# Patient Record
Sex: MALE | Race: OTHER | NOT HISPANIC OR LATINO | Employment: OTHER | URBAN - METROPOLITAN AREA
[De-identification: names, ages, dates, MRNs, and addresses within clinical notes are randomized per-mention and may not be internally consistent; named-entity substitution may affect disease eponyms.]

---

## 2017-01-31 ENCOUNTER — OFFICE VISIT (OUTPATIENT)
Dept: CARDIOLOGY | Facility: MEDICAL CENTER | Age: 62
End: 2017-01-31
Payer: COMMERCIAL

## 2017-01-31 VITALS
HEART RATE: 70 BPM | OXYGEN SATURATION: 94 % | WEIGHT: 248 LBS | SYSTOLIC BLOOD PRESSURE: 122 MMHG | DIASTOLIC BLOOD PRESSURE: 72 MMHG | BODY MASS INDEX: 32.87 KG/M2 | HEIGHT: 73 IN

## 2017-01-31 DIAGNOSIS — I15.9 SECONDARY HYPERTENSION: ICD-10-CM

## 2017-01-31 DIAGNOSIS — I48.91 ATRIAL FIBRILLATION, UNSPECIFIED TYPE (HCC): ICD-10-CM

## 2017-01-31 LAB — EKG IMPRESSION: NORMAL

## 2017-01-31 PROCEDURE — 93000 ELECTROCARDIOGRAM COMPLETE: CPT | Performed by: NURSE PRACTITIONER

## 2017-01-31 PROCEDURE — 99214 OFFICE O/P EST MOD 30 MIN: CPT | Performed by: NURSE PRACTITIONER

## 2017-01-31 RX ORDER — TESTOSTERONE 16.2 MG/G
GEL TRANSDERMAL
Refills: 0 | Status: ON HOLD | COMMUNITY
Start: 2016-12-06 | End: 2023-09-29

## 2017-01-31 RX ORDER — EFINACONAZOLE 100 MG/ML
SOLUTION TOPICAL
Refills: 12 | COMMUNITY
Start: 2016-11-14 | End: 2021-09-28

## 2017-01-31 NOTE — MR AVS SNAPSHOT
"        Myke Bustos   2017 3:20 PM   Office Visit   MRN: [id number]    Department:  [facility]   Dept Phone:  [phone]    Description:  Male : 1955   Provider:  AALIYAH Larkin           Reason for Visit     Follow-Up           Allergies as of 2017     No Known Allergies      You were diagnosed with     Atrial fibrillation, unspecified type (CMS-HCC)   [4219523]       Secondary hypertension   [8723840]         Vital Signs     Blood Pressure Pulse Height Weight Body Mass Index Oxygen Saturation    122/72 mmHg 70 1.854 m (6' 1\") 112.492 kg (248 lb) 32.73 kg/m2 94%    Smoking Status                   Never Smoker            Basic Information     Date Of Birth Sex Race Ethnicity Preferred Language    1955 Male White Non- English      Your appointments     Mar 16, 2017  2:30 PM   FOLLOW UP with Dalton Darden M.D.   [facility] (--)    [address], [facility] [address]  [facility] [id number] [phone]              Problem List              ICD-10-CM Priority Class Noted - Resolved    Atrial fibrillation (CMS-HCC) I48.91 Medium  2011 - Present    Long term current use of anticoagulant therapy Z79.01 High  2011 - Present    Pure hypercholesterolemia E78.00   2011 - Present    Essential hypertension, benign I10 High  2011 - Present    Hypothyroidism E03.9 Low  2011 - Present    Obesity E66.9 Low  2011 - Present    Sleep apnea (Chronic) G47.30   Unknown - Present    PAF (paroxysmal atrial fibrillation) (HCC) I48.0 Low  2011 - Present    Other general symptoms (Chronic) R68.89   Unknown - Present    Hypertension I10 Low  2013 - Present    HLD (hyperlipidemia) E78.5 Low  2016 - Present    High risk medication use Z79.899   2016 - Present    Chronic anticoagulation Z79.01   2016 - Present      Health Maintenance        Date Due Completion Dates    IMM DTaP/Tdap/Td Vaccine (1 " - Tdap) 4/15/1976 ---    COLONOSCOPY 4/15/2007 ---            Results       Current Immunizations     Influenza Vaccine Quad Inj (Pf) 12/17/2016  3:42 PM      Below and/or attached are the medications your provider expects you to take. Review all of your home medications and newly ordered medications with your provider and/or pharmacist. Follow medication instructions as directed by your provider and/or pharmacist. Please keep your medication list with you and share with your provider. Update the information when medications are discontinued, doses are changed, or new medications (including over-the-counter products) are added; and carry medication information at all times in the event of emergency situations     Allergies:  No Known Allergies          Medications  Valid as of: January 31, 2017 -  4:09 PM    Generic Name Brand Name Tablet Size Instructions for use    Digoxin (Tab) LANOXIN 250 MCG Take 1 Tab by mouth every day.        Efinaconazole (Solution) JUBLIA 10 % DARWIN ON THE SKIN D UTD        Levothyroxine Sodium (Tab) SYNTHROID 150 MCG Take 150 mcg by mouth Every morning on an empty stomach.        Simvastatin (Tab) ZOCOR 10 MG Take 10 mg by mouth every evening.        Sotalol HCl (Tab) BETAPACE 120 MG Take 1 Tab by mouth 2 Times a Day.        Testosterone (Gel) ANDROGEL PUMP 20.25 MG/ACT (1.62%) DARWIN 2 PUMPS TO EACH SHOULDER AND UPPER ARM QAM        Valsartan (Tab) DIOVAN 160 MG Take 160 mg by mouth 2 times a day.        .                 Medicines prescribed today were sent to:     [facility] [address]    [address] [facility] [address]nd [address] [facility] [address]    Phone: [phone] Fax: [phone]    Open 24 Hours?: No      Medication refill instructions:       If your prescription bottle indicates you have medication refills left, it is not necessary to call your provider’s office. Please contact your pharmacy and they will refill your medication.    If your  prescription bottle indicates you do not have any refills left, you may request refills at any time through one of the following ways: The online Duokan.com system (except Urgent Care), by calling your provider’s office, or by asking your pharmacy to contact your provider’s office with a refill request. Medication refills are processed only during regular business hours and may not be available until the next business day. Your provider may request additional information or to have a follow-up visit with you prior to refilling your medication.   *Please Note: Medication refills are assigned a new Rx number when refilled electronically. Your pharmacy may indicate that no refills were authorized even though a new prescription for the same medication is available at the pharmacy. Please request the medicine by name with the pharmacy before contacting your provider for a refill.           [email or web address] Access Code: [email or web address]  Expires: 2/10/2017  3:27 PM    Duokan.com  A secure, online tool to manage your health information     [employer]’s Duokan.com® is a secure, online tool that connects you to your personalized health information from the privacy of your home -- day or night - making it very easy for you to manage your healthcare. Once the activation process is completed, you can even access your medical information using the Duokan.com christopher, which is available for free in the Apple Christopher store or Google Play store.     Duokan.com provides the following levels of access (as shown below):   My Chart Features   [facility] Doctor [facility]  Specialists [facility]  Urgent  Care Non-[facility]  Primary Care  Doctor   Email your healthcare team securely and privately 24/7 X X X    Manage appointments: schedule your next appointment; view details of past/upcoming appointments X      Request prescription refills. X      View recent personal medical records, including lab and immunizations X X X X   View health record, including health  history, allergies, medications X X X X   Read reports about your outpatient visits, procedures, consult and ER notes X X X X   See your discharge summary, which is a recap of your hospital and/or ER visit that includes your diagnosis, lab results, and care plan. X X       How to register for UpCloo:  1. Go to  [email or web address].  2. Click on the Sign Up Now box, which takes you to the New Member Sign Up page. You will need to provide the following information:  a. Enter your UpCloo Access Code exactly as it appears at the top of this page. (You will not need to use this code after you’ve completed the sign-up process. If you do not sign up before the expiration date, you must request a new code.)   b. Enter your date of birth.   c. Enter your home email address.   d. Click Submit, and follow the next screen’s instructions.  3. Create a UpCloo ID. This will be your UpCloo login ID and cannot be changed, so think of one that is secure and easy to remember.  4. Create a Teralynkt password. You can change your password at any time.  5. Enter your Password Reset Question and Answer. This can be used at a later time if you forget your password.   6. Enter your e-mail address. This allows you to receive e-mail notifications when new information is available in UpCloo.  7. Click Sign Up. You can now view your health information.    For assistance activating your UpCloo account, call [phone]

## 2017-01-31 NOTE — Clinical Note
[facility]   [address] [place] [address] [address]  [place] [address]  Phone: [phone]  Fax: [phone]              Myke Bustos  1955    Encounter Date: 1/31/2017    AALIYAH Larkin          PROGRESS NOTE:  No notes on file      BLADIMIR Coon  [address]  [address]  [facility] [address]  VIA Mail

## 2017-02-01 ASSESSMENT — ENCOUNTER SYMPTOMS
ORTHOPNEA: 0
DIARRHEA: 0
HEARTBURN: 0
WEIGHT LOSS: 0
FOCAL WEAKNESS: 0
CONSTIPATION: 0
SORE THROAT: 0
VOMITING: 0
SPEECH CHANGE: 0
CHILLS: 0
WHEEZING: 0
LOSS OF CONSCIOUSNESS: 0
BLOOD IN STOOL: 0
ABDOMINAL PAIN: 0
SPUTUM PRODUCTION: 0
SHORTNESS OF BREATH: 0
DOUBLE VISION: 0
PND: 0
BLURRED VISION: 0
SENSORY CHANGE: 0
MUSCULOSKELETAL NEGATIVE: 1
NAUSEA: 0
COUGH: 0
PALPITATIONS: 0
TINGLING: 0
HEMOPTYSIS: 0
HEADACHES: 0
DIZZINESS: 0
FEVER: 0
TREMORS: 0

## 2017-02-01 NOTE — PROGRESS NOTES
Subjective:   Myke Bustos is a 61 y.o. male who presents today for follow up Afib and anticoagulation.    He was admitted to [facility] for N/DV/D, abd pain Dec 2016.  Had a syncopal episode with vomiting felt to be vasovagal in nature.  During admission his Sotalol was held secondary to PO intolerance and he eventually developed Afib with RVR.  He was started on Cardizem for rate control and eventually transitioned back to Sotalol.  He was cardioverted back into NSR.    Today in follow up he states that he has continued to do well.  He states that he has not had any recurrence of Afib. No syncope or pre syncope.  No dizziness, chest pain, or dyspnea.    He has been taking his medication without missed doses.    He would like to consider transitioning back to ASA instead of Xarelto.        Past Medical History   Diagnosis Date   • Atrial fibrillation (CMS-HCC) 12/29/2011   • Long term (current) use of anticoagulants 12/29/2011   • Pure hypercholesterolemia 12/29/2011   • Essential hypertension, benign 12/29/2011   • Unspecified hypothyroidism 12/29/2011   • Obesity 12/29/2011   • Sleep apnea 12/29/2011   • Other general symptoms(780.99)      Past Surgical History   Procedure Laterality Date   • Appendectomy       1994     Family History   Problem Relation Age of Onset   • Heart Disease Mother      ACUTE MI IN HER 60s.   • Heart Disease Father      ACUTE MI IN HIS 50s.     History   Smoking status   • Never Smoker    Smokeless tobacco   • Never Used     No Known Allergies  Outpatient Encounter Prescriptions as of 1/31/2017   Medication Sig Dispense Refill   • ANDROGEL PUMP 20.25 MG/ACT (1.62%) Gel DARWIN 2 PUMPS TO EACH SHOULDER AND UPPER ARM QAM  0   • JUBLIA 10 % Solution DARWIN ON THE SKIN D UTD  12   • sotalol (BETAPACE) 120 MG tablet Take 1 Tab by mouth 2 Times a Day. 60 Tab 3   • levothyroxine (SYNTHROID) 150 MCG Tab Take 150 mcg by mouth Every morning on an empty stomach.     • digoxin (LANOXIN) 250 MCG TABS Take  "1 Tab by mouth every day. 90 Tab 0   • valsartan (DIOVAN) 160 MG TABS Take 160 mg by mouth 2 times a day.     • simvastatin (ZOCOR) 10 MG TABS Take 10 mg by mouth every evening.       No facility-administered encounter medications on file as of 1/31/2017.     Review of Systems   Constitutional: Negative for fever, chills, weight loss and malaise/fatigue.   HENT: Negative for congestion, nosebleeds, sore throat and tinnitus.    Eyes: Negative for blurred vision and double vision.   Respiratory: Negative for cough, hemoptysis, sputum production, shortness of breath and wheezing.    Cardiovascular: Negative for chest pain, palpitations, orthopnea, leg swelling and PND.   Gastrointestinal: Negative for heartburn, nausea, vomiting, abdominal pain, diarrhea, constipation, blood in stool and melena.   Genitourinary: Negative.    Musculoskeletal: Negative.    Skin: Negative for rash.   Neurological: Negative for dizziness, tingling, tremors, sensory change, speech change, focal weakness, loss of consciousness and headaches.        Objective:   /72 mmHg  Pulse 70  Ht 1.854 m (6' 1\")  Wt 112.492 kg (248 lb)  BMI 32.73 kg/m2  SpO2 94%    Physical Exam   Constitutional: He is oriented to person, place, and time. He appears well-developed and well-nourished.   HENT:   Head: Normocephalic and atraumatic.   Eyes: Conjunctivae are normal.   Neck: Normal range of motion. Neck supple. No JVD present.   Cardiovascular: Normal rate, regular rhythm, normal heart sounds and intact distal pulses.  Exam reveals no gallop and no friction rub.    No murmur heard.  Pulmonary/Chest: Effort normal and breath sounds normal. No respiratory distress. He has no wheezes. He has no rales. He exhibits no tenderness.   Abdominal: Soft. Bowel sounds are normal. There is no tenderness.   Musculoskeletal: He exhibits no edema.   Neurological: He is alert and oriented to person, place, and time.   Skin: Skin is warm and dry. No erythema. "   Psychiatric: He has a normal mood and affect. His behavior is normal.       Assessment:     1. Atrial fibrillation, unspecified type (CMS-HCC)  EKG   2. Secondary hypertension  EKG       Medical Decision Making:  Today's Assessment / Status / Plan:   1. Afib:  -12 lead EKG today maintaining NSR.  Per patient report he has not had any recurrence of arrhythmia.   - Continue Sotalol 120 mg BID.  Continue Xarelto.  In one month if no Afib can consider transition back to ASA.    2. High Risk medication (Sotalol):  - QTc today stable at 405 msec .     3. HTN:   - BP well controlled today.  Continue low sodium diet, discussed lifestyle modifications.    4. Anticoagulation:  - Denies s/s of bleeding.   - The patients [id number] score is 1 (HTN).  He was previously on ASA before cardioversion.  He has been on Xarelto for >1 month post cardioversion.  He understands his risk and would like to transition back to ASA.  He will finish Xarelto bottle and then start ASA 81 mg PO daily.       RTC in March as scheduled with Dr. Darden, sooner if needed.  Collaborating MD/ADD: Xi.

## 2017-02-02 ENCOUNTER — TELEPHONE (OUTPATIENT)
Dept: CARDIOLOGY | Facility: MEDICAL CENTER | Age: 62
End: 2017-02-02

## 2017-02-03 NOTE — TELEPHONE ENCOUNTER
----- Message from AALIYAH Larkin sent at 2/2/2017  4:34 PM PST -----  Recent labs- BMP WNL.  TSH was normal too.

## 2017-02-10 ENCOUNTER — TELEPHONE (OUTPATIENT)
Dept: CARDIOLOGY | Facility: MEDICAL CENTER | Age: 62
End: 2017-02-10

## 2017-02-10 NOTE — TELEPHONE ENCOUNTER
"----- Message from Dafne Littlejohn sent at 2/10/2017  9:54 AM PST -----  Regarding: lightheaded & sob last nite  Contact: [phone]  SAIDA/luci    Pt calling to report last nite episode of lightheadedness & shortness of breath after a very long day at work.  Today, pt feels he is \"lacking a little enthusiasm, kind of dragging\".   Please call Myke to discuss, [phone].  "

## 2017-02-10 NOTE — TELEPHONE ENCOUNTER
AALIYAH Larkin R.N.        Caller: Unspecified (Today, 2:43 PM)                     Yes, 120 has been working well for him.  We don't have any evidence for dose increase at this time.  Dr. Alford actually prefers the 120 mg dose for Afib control.  It is possible that dehydration and over doing can cause similar symptoms.  I am encouraged that his rate was regular.      Notified patient.

## 2017-02-10 NOTE — TELEPHONE ENCOUNTER
Pt overworked yesterday, felt lightheaded when he got home.  Rehydrated and felt better except mild SOB which has improved.  /80 HR 74.  Regular rhythm.  SOB has improved today, still feels tired.    Advised to keep hydrated, take it easy. If SOB increases, go to ER .   He is still on xarelto at this time.    ORTIZ Moses: pt said he was previously on 160mg sotalol BID and was concerned he is only taking 120mg BID now.  I reassured him that the lower dose seems to be working well, and less is better if effective.    Anything else I should discuss with pt?

## 2017-03-14 ENCOUNTER — TELEPHONE (OUTPATIENT)
Dept: CARDIOLOGY | Facility: MEDICAL CENTER | Age: 62
End: 2017-03-14

## 2017-03-14 NOTE — TELEPHONE ENCOUNTER
----- Message from Phyllis Copeland sent at 3/14/2017 11:35 AM PDT -----  Regarding: Patient calling for lab results  SAIDA/Lisa    Patient is calling to get his lab results and can be reached at [phone].

## 2017-03-16 ENCOUNTER — OFFICE VISIT (OUTPATIENT)
Dept: CARDIOLOGY | Facility: MEDICAL CENTER | Age: 62
End: 2017-03-16
Payer: COMMERCIAL

## 2017-03-16 VITALS
OXYGEN SATURATION: 91 % | HEART RATE: 77 BPM | HEIGHT: 73 IN | BODY MASS INDEX: 34.33 KG/M2 | DIASTOLIC BLOOD PRESSURE: 82 MMHG | WEIGHT: 259 LBS | SYSTOLIC BLOOD PRESSURE: 128 MMHG

## 2017-03-16 DIAGNOSIS — Z79.899 LONG TERM CURRENT USE OF ANTIARRHYTHMIC MEDICAL THERAPY: ICD-10-CM

## 2017-03-16 DIAGNOSIS — I48.0 PAF (PAROXYSMAL ATRIAL FIBRILLATION) (HCC): ICD-10-CM

## 2017-03-16 DIAGNOSIS — E78.5 HYPERLIPIDEMIA, UNSPECIFIED HYPERLIPIDEMIA TYPE: ICD-10-CM

## 2017-03-16 DIAGNOSIS — I10 ESSENTIAL HYPERTENSION: ICD-10-CM

## 2017-03-16 PROCEDURE — 99214 OFFICE O/P EST MOD 30 MIN: CPT | Performed by: INTERNAL MEDICINE

## 2017-03-16 ASSESSMENT — ENCOUNTER SYMPTOMS
MYALGIAS: 0
ORTHOPNEA: 0
DIZZINESS: 0
SHORTNESS OF BREATH: 0
LOSS OF CONSCIOUSNESS: 0
PND: 0

## 2017-03-16 NOTE — Clinical Note
[facility]   [address]  [place] [address]  Phone: [phone]  Fax: [phone]              Myke Bustos  1955    Encounter Date: 3/16/2017    Dalton Darden M.D.          PROGRESS NOTE:  Subjective:   Myke Bustos is a 57 y.o. male who presents today for follow-up evaluation of PAF, antiarrhythmic therapy, hypertension and hyperlipidemia.     Last seen 8/25/2014.    No cardiac symptoms.  Just returned from a trip from [facility].    Between 12/13/2016-12/17/2016 hospitalized that [facility]  Severe gastroenteritis with nausea and vomiting.  Recurrent atrial fibrillation.  Required electrical cardioversion 12/16/2016.    Past Medical History   Diagnosis Date   • Atrial fibrillation (CMS-HCC) 12/29/2011   • Long term (current) use of anticoagulants 12/29/2011   • Pure hypercholesterolemia 12/29/2011   • Essential hypertension, benign 12/29/2011   • Unspecified hypothyroidism 12/29/2011   • Obesity 12/29/2011   • Sleep apnea 12/29/2011   • Other general symptoms(780.99)      Past Surgical History   Procedure Laterality Date   • Appendectomy       1994     Family History   Problem Relation Age of Onset   • Heart Disease Mother      ACUTE MI IN HER 60s.   • Heart Disease Father      ACUTE MI IN HIS 50s.     History   Smoking status   • Never Smoker    Smokeless tobacco   • Never Used     No Known Allergies  Outpatient Encounter Prescriptions as of 3/16/2017   Medication Sig Dispense Refill   • aspirin EC (ECOTRIN) 81 MG Tablet Delayed Response Take 81 mg by mouth every day.     • ANDROGEL PUMP 20.25 MG/ACT (1.62%) Gel DARWIN 2 PUMPS TO EACH SHOULDER AND UPPER ARM QAM  0   • JUBLIA 10 % Solution DARWIN ON THE SKIN D UTD  12   • sotalol (BETAPACE) 120 MG tablet Take 1 Tab by mouth 2 Times a Day. 60 Tab 3   • levothyroxine (SYNTHROID) 150 MCG Tab Take 150 mcg by mouth Every morning on an empty stomach.     • digoxin (LANOXIN) 250 MCG  "TABS Take 1 Tab by mouth every day. 90 Tab 0   • valsartan (DIOVAN) 160 MG TABS Take 160 mg by mouth 2 times a day.     • simvastatin (ZOCOR) 10 MG TABS Take 10 mg by mouth every evening.       No facility-administered encounter medications on file as of 3/16/2017.     Review of Systems   Respiratory: Negative for shortness of breath.    Cardiovascular: Negative for orthopnea, leg swelling and PND.   Musculoskeletal: Negative for myalgias.   Neurological: Negative for dizziness and loss of consciousness.        Objective:   /82 mmHg  Pulse 77  Ht 1.854 m (6' 0.99\")  Wt 117.482 kg (259 lb)  BMI 34.18 kg/m2  SpO2 91%    Physical Exam   Constitutional: He is oriented to person, place, and time. He appears well-nourished. No distress.   HENT:   Head: Normocephalic.   Eyes: EOM are normal.   Neck: No JVD present. Carotid bruit is not present.   Normal jugular venous pressure.   Cardiovascular: Normal rate, regular rhythm, S1 normal, S2 normal and normal heart sounds.  Exam reveals no gallop and no friction rub.    No murmur heard.  Pulses:       Carotid pulses are 2+ on the right side, and 2+ on the left side.       Radial pulses are 2+ on the right side, and 2+ on the left side.   Pulmonary/Chest: Effort normal and breath sounds normal. He has no wheezes. He has no rhonchi. He has no rales.   Abdominal: Soft. He exhibits no distension. There is no tenderness. There is no guarding.       Musculoskeletal: He exhibits no edema.   Neurological: He is alert and oriented to person, place, and time.   Skin: Skin is warm and dry.   Psychiatric: He has a normal mood and affect. His behavior is normal.       Assessment:     1. PAF (paroxysmal atrial fibrillation) (HCC)     2. Long term current use of antiarrhythmic medical therapy     3. Essential hypertension     4. Hyperlipidemia, unspecified hyperlipidemia type       12/28/2012 Lab: Cholesterol 175. Triglycerides 102. HDL 61. . Digoxin level 0.7.  07/14/2014 " Lab: Cholesterol 136. Triglycerides 64. HDL 49. LDA 74. TSH 5.75.    Medical Decision Making:  Today's Assessment / Status / Plan:     PAF. Maintaining NSR. Continue current therapy.    Electrical cardioversion 12/16/2016.    Antiarrhythmic therapy with sotalol.    Hypertension. Controlled. On valsartan.     HLD. On simvastatin.    Continue current therapy.  Followup one year        BRYAN CoonN.  [address]  [address]  [facility] [address]  VIA Mail

## 2017-03-16 NOTE — PROGRESS NOTES
Subjective:   Myke Bustos is a 57 y.o. male who presents today for follow-up evaluation of PAF, antiarrhythmic therapy, hypertension and hyperlipidemia.     Last seen 8/25/2014.    No cardiac symptoms.  Just returned from a trip from [facility].    Between 12/13/2016-12/17/2016 hospitalized that [facility].  Severe gastroenteritis with nausea and vomiting.  Recurrent atrial fibrillation.  Required electrical cardioversion 12/16/2016.    Past Medical History   Diagnosis Date   • Atrial fibrillation (CMS-HCC) 12/29/2011   • Long term (current) use of anticoagulants 12/29/2011   • Pure hypercholesterolemia 12/29/2011   • Essential hypertension, benign 12/29/2011   • Unspecified hypothyroidism 12/29/2011   • Obesity 12/29/2011   • Sleep apnea 12/29/2011   • Other general symptoms(780.99)      Past Surgical History   Procedure Laterality Date   • Appendectomy       1994     Family History   Problem Relation Age of Onset   • Heart Disease Mother      ACUTE MI IN HER 60s.   • Heart Disease Father      ACUTE MI IN HIS 50s.     History   Smoking status   • Never Smoker    Smokeless tobacco   • Never Used     No Known Allergies  Outpatient Encounter Prescriptions as of 3/16/2017   Medication Sig Dispense Refill   • aspirin EC (ECOTRIN) 81 MG Tablet Delayed Response Take 81 mg by mouth every day.     • ANDROGEL PUMP 20.25 MG/ACT (1.62%) Gel DARWIN 2 PUMPS TO EACH SHOULDER AND UPPER ARM QAM  0   • JUBLIA 10 % Solution DARWIN ON THE SKIN D UTD  12   • sotalol (BETAPACE) 120 MG tablet Take 1 Tab by mouth 2 Times a Day. 60 Tab 3   • levothyroxine (SYNTHROID) 150 MCG Tab Take 150 mcg by mouth Every morning on an empty stomach.     • digoxin (LANOXIN) 250 MCG TABS Take 1 Tab by mouth every day. 90 Tab 0   • valsartan (DIOVAN) 160 MG TABS Take 160 mg by mouth 2 times a day.     • simvastatin (ZOCOR) 10 MG TABS Take 10 mg by mouth every evening.       No facility-administered encounter medications on file as of 3/16/2017.  "    Review of Systems   Respiratory: Negative for shortness of breath.    Cardiovascular: Negative for orthopnea, leg swelling and PND.   Musculoskeletal: Negative for myalgias.   Neurological: Negative for dizziness and loss of consciousness.        Objective:   /82 mmHg  Pulse 77  Ht 1.854 m (6' 0.99\")  Wt 117.482 kg (259 lb)  BMI 34.18 kg/m2  SpO2 91%    Physical Exam   Constitutional: He is oriented to person, place, and time. He appears well-nourished. No distress.   HENT:   Head: Normocephalic.   Eyes: EOM are normal.   Neck: No JVD present. Carotid bruit is not present.   Normal jugular venous pressure.   Cardiovascular: Normal rate, regular rhythm, S1 normal, S2 normal and normal heart sounds.  Exam reveals no gallop and no friction rub.    No murmur heard.  Pulses:       Carotid pulses are 2+ on the right side, and 2+ on the left side.       Radial pulses are 2+ on the right side, and 2+ on the left side.   Pulmonary/Chest: Effort normal and breath sounds normal. He has no wheezes. He has no rhonchi. He has no rales.   Abdominal: Soft. He exhibits no distension. There is no tenderness. There is no guarding.       Musculoskeletal: He exhibits no edema.   Neurological: He is alert and oriented to person, place, and time.   Skin: Skin is warm and dry.   Psychiatric: He has a normal mood and affect. His behavior is normal.       Assessment:     1. PAF (paroxysmal atrial fibrillation) (HCC)     2. Long term current use of antiarrhythmic medical therapy     3. Essential hypertension     4. Hyperlipidemia, unspecified hyperlipidemia type       12/28/2012 Lab: Cholesterol 175. Triglycerides 102. HDL 61. . Digoxin level 0.7.  07/14/2014 Lab: Cholesterol 136. Triglycerides 64. HDL 49. LDA 74. TSH 5.75.    Medical Decision Making:  Today's Assessment / Status / Plan:     PAF. Maintaining NSR. Continue current therapy.    Electrical cardioversion 12/16/2016.    Antiarrhythmic therapy with " sotalol.    Hypertension. Controlled. On valsartan.     HLD. On simvastatin.    Continue current therapy.  Followup one year

## 2017-03-16 NOTE — MR AVS SNAPSHOT
"        Myke Bustos   3/16/2017 2:15 PM   Office Visit   MRN: [id number]    Department:  [facility]   Dept Phone:  [phone]    Description:  Male : 1955   Provider:  Dalton Draden M.D.           Reason for Visit     Follow-Up           Allergies as of 3/16/2017     No Known Allergies      You were diagnosed with     PAF (paroxysmal atrial fibrillation) (CMS-HCC)   [495513]       Long term current use of antiarrhythmic medical therapy   [1686897]       Essential hypertension   [0346305]       Hyperlipidemia, unspecified hyperlipidemia type   [6545677]         Vital Signs     Blood Pressure Pulse Height Weight Body Mass Index Oxygen Saturation    128/82 mmHg 77 1.854 m (6' 0.99\") 117.482 kg (259 lb) 34.18 kg/m2 91%    Smoking Status                   Never Smoker            Basic Information     Date Of Birth Sex Race Ethnicity Preferred Language    1955 Male White Non- English      Problem List              ICD-10-CM Priority Class Noted - Resolved    Pure hypercholesterolemia E78.00   2011 - Present    Essential hypertension, benign I10 High  2011 - Present    Hypothyroidism E03.9 Low  2011 - Present    Obesity E66.9 Low  2011 - Present    Sleep apnea (Chronic) G47.30   Unknown - Present    PAF (paroxysmal atrial fibrillation) (HCC) I48.0 Low  2011 - Present    Other general symptoms (Chronic) R68.89   Unknown - Present    HLD (hyperlipidemia) E78.5 Low  2016 - Present    High risk medication use Z79.899   2016 - Present    Long term current use of antiarrhythmic medical therapy Z79.899   3/16/2017 - Present      Health Maintenance        Date Due Completion Dates    IMM DTaP/Tdap/Td Vaccine (1 - Tdap) 4/15/1976 ---    COLONOSCOPY 4/15/2007 ---            Current Immunizations     Influenza Vaccine Quad Inj (Pf) 2016  3:42 PM      Below and/or attached are the medications your provider expects you to take. Review all of your " home medications and newly ordered medications with your provider and/or pharmacist. Follow medication instructions as directed by your provider and/or pharmacist. Please keep your medication list with you and share with your provider. Update the information when medications are discontinued, doses are changed, or new medications (including over-the-counter products) are added; and carry medication information at all times in the event of emergency situations     Allergies:  No Known Allergies          Medications  Valid as of: March 16, 2017 -  2:46 PM    Generic Name Brand Name Tablet Size Instructions for use    Aspirin (Tablet Delayed Response) ECOTRIN 81 MG Take 81 mg by mouth every day.        Digoxin (Tab) LANOXIN 250 MCG Take 1 Tab by mouth every day.        Efinaconazole (Solution) JUBLIA 10 % DARWIN ON THE SKIN D UTD        Levothyroxine Sodium (Tab) SYNTHROID 150 MCG Take 150 mcg by mouth Every morning on an empty stomach.        Simvastatin (Tab) ZOCOR 10 MG Take 10 mg by mouth every evening.        Sotalol HCl (Tab) BETAPACE 120 MG Take 1 Tab by mouth 2 Times a Day.        Testosterone (Gel) ANDROGEL PUMP 20.25 MG/ACT (1.62%) DARWIN 2 PUMPS TO EACH SHOULDER AND UPPER ARM QAM        Valsartan (Tab) DIOVAN 160 MG Take 160 mg by mouth 2 times a day.        .                 Medicines prescribed today were sent to:     [facility] HOME DELIVERY [facility] [address]    [address] [place] [id number]    Phone: [phone] Fax: [phone]    Open 24 Hours?: No      Medication refill instructions:       If your prescription bottle indicates you have medication refills left, it is not necessary to call your provider’s office. Please contact your pharmacy and they will refill your medication.    If your prescription bottle indicates you do not have any refills left, you may request refills at any time through one of the following ways: The online BoostSuite system (except Urgent  Care), by calling your provider’s office, or by asking your pharmacy to contact your provider’s office with a refill request. Medication refills are processed only during regular business hours and may not be available until the next business day. Your provider may request additional information or to have a follow-up visit with you prior to refilling your medication.   *Please Note: Medication refills are assigned a new Rx number when refilled electronically. Your pharmacy may indicate that no refills were authorized even though a new prescription for the same medication is available at the pharmacy. Please request the medicine by name with the pharmacy before contacting your provider for a refill.           [email or web address] Access Code: [email or web address]  Expires: 4/9/2017  2:36 PM    UPSIDO.com  A secure, online tool to manage your health information     [employer]’s UPSIDO.com® is a secure, online tool that connects you to your personalized health information from the privacy of your home -- day or night - making it very easy for you to manage your healthcare. Once the activation process is completed, you can even access your medical information using the UPSIDO.com christopher, which is available for free in the Apple Christopher store or Google Play store.     UPSIDO.com provides the following levels of access (as shown below):   My Chart Features   [facility] Doctor [facility]  Urgent  Care Non-[facility]  Primary Care  Doctor   Email your healthcare team securely and privately 24/7 X X X    Manage appointments: schedule your next appointment; view details of past/upcoming appointments X      Request prescription refills. X      View recent personal medical records, including lab and immunizations X X X X   View health record, including health history, allergies, medications X X X X   Read reports about your outpatient visits, procedures, consult and ER notes X X X X   See your discharge summary, which is a recap of your  hospital and/or ER visit that includes your diagnosis, lab results, and care plan. X X       How to register for Yugma:  1. Go to  [email or web address].  2. Click on the Sign Up Now box, which takes you to the New Member Sign Up page. You will need to provide the following information:  a. Enter your Yugma Access Code exactly as it appears at the top of this page. (You will not need to use this code after you’ve completed the sign-up process. If you do not sign up before the expiration date, you must request a new code.)   b. Enter your date of birth.   c. Enter your home email address.   d. Click Submit, and follow the next screen’s instructions.  3. Create a [employer] ID. This will be your Yugma login ID and cannot be changed, so think of one that is secure and easy to remember.  4. Create a [employer] password. You can change your password at any time.  5. Enter your Password Reset Question and Answer. This can be used at a later time if you forget your password.   6. Enter your e-mail address. This allows you to receive e-mail notifications when new information is available in Yugma.  7. Click Sign Up. You can now view your health information.    For assistance activating your Yugma account, call [phone]

## 2017-04-14 DIAGNOSIS — I48.0 PAF (PAROXYSMAL ATRIAL FIBRILLATION) (HCC): ICD-10-CM

## 2017-04-14 RX ORDER — SOTALOL HYDROCHLORIDE 120 MG/1
120 TABLET ORAL 2 TIMES DAILY
Qty: 60 TAB | Refills: 6 | Status: SHIPPED | OUTPATIENT
Start: 2017-04-14 | End: 2017-08-25 | Stop reason: SDUPTHER

## 2017-08-25 DIAGNOSIS — I48.0 PAF (PAROXYSMAL ATRIAL FIBRILLATION) (HCC): ICD-10-CM

## 2017-08-25 RX ORDER — SOTALOL HYDROCHLORIDE 120 MG/1
120 TABLET ORAL 2 TIMES DAILY
Qty: 180 TAB | Refills: 2 | Status: SHIPPED | OUTPATIENT
Start: 2017-08-25 | End: 2018-05-01 | Stop reason: SDUPTHER

## 2018-05-01 ENCOUNTER — TELEPHONE (OUTPATIENT)
Dept: CARDIOLOGY | Facility: MEDICAL CENTER | Age: 63
End: 2018-05-01

## 2018-05-01 DIAGNOSIS — I48.0 PAF (PAROXYSMAL ATRIAL FIBRILLATION) (HCC): ICD-10-CM

## 2018-05-01 RX ORDER — SOTALOL HYDROCHLORIDE 120 MG/1
120 TABLET ORAL 2 TIMES DAILY
Qty: 180 TAB | Refills: 1 | Status: SHIPPED | OUTPATIENT
Start: 2018-05-01 | End: 2018-05-07 | Stop reason: SDUPTHER

## 2018-05-01 NOTE — TELEPHONE ENCOUNTER
Alonso Winchester, Gutierrez Ass't   Cahterine Hadley R.N. 3 hours ago (7:47 AM)      Catherine would you mind asking Dr. LI if he would like this patient to have an EKG first before refilling this Medication last EKG was done last Yr. That way I can get this patient on our EKG Penny. Please advice. (Routing comment)        Ekg is scheduled 5/2. Med refilled.

## 2018-05-01 NOTE — TELEPHONE ENCOUNTER
Refill request for Sotalol received.  Per Dr. Darden, it is required patient get an EKG first.   Last OV 3/16/17  Next FV 9/11/18    Attempted to contact patient, no answer. LVM to call back.    _________________________________________________    Patient called, warm transferred by .     Patient confirms he is still taking Sotalol. Instructed patient EKG needed. Patient agreeable.     EKG scheduled for 5/2 at 8:45am.          To SW if earlier FV with APN is ok, or needed with SW

## 2018-05-02 ENCOUNTER — NON-PROVIDER VISIT (OUTPATIENT)
Dept: CARDIOLOGY | Facility: MEDICAL CENTER | Age: 63
End: 2018-05-02
Payer: COMMERCIAL

## 2018-05-02 ENCOUNTER — TELEPHONE (OUTPATIENT)
Dept: CARDIOLOGY | Facility: MEDICAL CENTER | Age: 63
End: 2018-05-02

## 2018-05-02 VITALS
OXYGEN SATURATION: 94 % | HEART RATE: 57 BPM | HEIGHT: 73 IN | SYSTOLIC BLOOD PRESSURE: 136 MMHG | DIASTOLIC BLOOD PRESSURE: 82 MMHG

## 2018-05-02 DIAGNOSIS — I48.0 PAF (PAROXYSMAL ATRIAL FIBRILLATION) (HCC): ICD-10-CM

## 2018-05-02 PROCEDURE — 93000 ELECTROCARDIOGRAM COMPLETE: CPT | Performed by: INTERNAL MEDICINE

## 2018-05-02 RX ORDER — ZOSTER VACCINE LIVE 19400 [PFU]/.65ML
INJECTION, POWDER, LYOPHILIZED, FOR SUSPENSION SUBCUTANEOUS
Refills: 0 | COMMUNITY
Start: 2018-02-07 | End: 2022-02-15

## 2018-05-02 NOTE — TELEPHONE ENCOUNTER
Message   Received: Today   Message Contents   Cassandra Aguilar, Med Ass't  Catherine Hadley R.N.             EKG ready for review in cardioservier-am       Non-Provider Visit for Atrial Fibrillation   5/2/2018   EKG-[facility] - [facility] Encounter Summary   Diagnosis      PAF (paroxysmal atrial fibrillation) (HCC)   Orders Signed This Encounter   View All Results    (2)   ZOSTAVAX 46327 UNT/0.65ML Recon Susp    EKG View Result   Orders Pended This Encounter     None     ______________________    Had ADD (Dr. KAISER) review.  OK to send patient home and refill Sotalol.  OK to wait for SW to interpret EKG in [facility].       To SW

## 2018-05-03 LAB — EKG IMPRESSION: NORMAL

## 2018-05-07 DIAGNOSIS — I48.0 PAF (PAROXYSMAL ATRIAL FIBRILLATION) (HCC): ICD-10-CM

## 2018-05-07 RX ORDER — SOTALOL HYDROCHLORIDE 120 MG/1
120 TABLET ORAL 2 TIMES DAILY
Qty: 180 TAB | Refills: 1 | Status: SHIPPED | OUTPATIENT
Start: 2018-05-07 | End: 2018-11-05 | Stop reason: SDUPTHER

## 2018-09-11 ENCOUNTER — APPOINTMENT (OUTPATIENT)
Dept: CARDIOLOGY | Facility: MEDICAL CENTER | Age: 63
End: 2018-09-11
Payer: COMMERCIAL

## 2018-11-05 DIAGNOSIS — I48.0 PAF (PAROXYSMAL ATRIAL FIBRILLATION) (HCC): ICD-10-CM

## 2018-11-05 RX ORDER — SOTALOL HYDROCHLORIDE 120 MG/1
120 TABLET ORAL 2 TIMES DAILY
Qty: 180 TAB | Refills: 0 | Status: SHIPPED | OUTPATIENT
Start: 2018-11-05 | End: 2019-02-08 | Stop reason: SDUPTHER

## 2019-02-07 ENCOUNTER — TELEPHONE (OUTPATIENT)
Dept: CARDIOLOGY | Facility: MEDICAL CENTER | Age: 64
End: 2019-02-07

## 2019-02-07 NOTE — TELEPHONE ENCOUNTER
LVM for pt to see if had any previus labs or cardiac testing outside of [facility].

## 2019-02-08 DIAGNOSIS — I48.0 PAF (PAROXYSMAL ATRIAL FIBRILLATION) (HCC): ICD-10-CM

## 2019-02-08 NOTE — TELEPHONE ENCOUNTER
Pt had called me letting me know that he will have labs done for another doctors office on 02/19/2019 at [facility].

## 2019-02-09 RX ORDER — SOTALOL HYDROCHLORIDE 120 MG/1
120 TABLET ORAL 2 TIMES DAILY
Qty: 180 TAB | Refills: 3 | Status: SHIPPED | OUTPATIENT
Start: 2019-02-09 | End: 2020-05-06

## 2019-05-28 ENCOUNTER — OFFICE VISIT (OUTPATIENT)
Dept: CARDIOLOGY | Facility: MEDICAL CENTER | Age: 64
End: 2019-05-28
Payer: COMMERCIAL

## 2019-05-28 VITALS
HEIGHT: 72 IN | DIASTOLIC BLOOD PRESSURE: 76 MMHG | HEART RATE: 54 BPM | WEIGHT: 254 LBS | BODY MASS INDEX: 34.4 KG/M2 | OXYGEN SATURATION: 93 % | SYSTOLIC BLOOD PRESSURE: 138 MMHG

## 2019-05-28 DIAGNOSIS — I48.91 ATRIAL FIBRILLATION, UNSPECIFIED TYPE (HCC): ICD-10-CM

## 2019-05-28 DIAGNOSIS — Z51.81 ENCOUNTER FOR MONITORING OF CHRONIC ASPIRIN THERAPY: ICD-10-CM

## 2019-05-28 DIAGNOSIS — I10 ESSENTIAL HYPERTENSION, BENIGN: ICD-10-CM

## 2019-05-28 DIAGNOSIS — Z79.899 LONG TERM CURRENT USE OF ANTIARRHYTHMIC MEDICAL THERAPY: ICD-10-CM

## 2019-05-28 DIAGNOSIS — Z79.899 HIGH RISK MEDICATION USE: ICD-10-CM

## 2019-05-28 DIAGNOSIS — I48.0 PAF (PAROXYSMAL ATRIAL FIBRILLATION) (HCC): ICD-10-CM

## 2019-05-28 DIAGNOSIS — Z79.82 ENCOUNTER FOR MONITORING OF CHRONIC ASPIRIN THERAPY: ICD-10-CM

## 2019-05-28 LAB — EKG IMPRESSION: NORMAL

## 2019-05-28 PROCEDURE — 99214 OFFICE O/P EST MOD 30 MIN: CPT | Performed by: INTERNAL MEDICINE

## 2019-05-28 PROCEDURE — 93000 ELECTROCARDIOGRAM COMPLETE: CPT | Performed by: INTERNAL MEDICINE

## 2019-05-28 ASSESSMENT — ENCOUNTER SYMPTOMS
PALPITATIONS: 0
SHORTNESS OF BREATH: 0
DIZZINESS: 0
COUGH: 0
MYALGIAS: 0
LOSS OF CONSCIOUSNESS: 0

## 2019-05-28 NOTE — PROGRESS NOTES
Chief Complaint   Patient presents with   • Atrial Fibrillation       Subjective:   Myke Bustos is a 64 y.o. male who presents today for follow-up evaluation of PAF, antiarrhythmic therapy, hypertension and hyperlipidemia.      Last seen 3/16/2017.    Since 3/16/2017 appointment the patient's had no cardiac symptoms.  States BP generally runs 117 systolic     No cardiac symptoms.  Just returned from a trip from [facility].     Between 12/13/2016-12/17/2016 hospitalized that [facility].  Severe gastroenteritis with nausea and vomiting.  Recurrent atrial fibrillation.  Required electrical cardioversion 12/16/2016.    Past Medical History:   Diagnosis Date   • Atrial fibrillation (HCC) 12/29/2011   • Essential hypertension, benign 12/29/2011   • Long term (current) use of anticoagulants 12/29/2011   • Obesity 12/29/2011   • Other general symptoms(780.99)    • Pure hypercholesterolemia 12/29/2011   • Sleep apnea 12/29/2011   • Unspecified hypothyroidism 12/29/2011     Past Surgical History:   Procedure Laterality Date   • APPENDECTOMY      1994     Family History   Problem Relation Age of Onset   • Heart Disease Mother         ACUTE MI IN HER 60s.   • Heart Disease Father         ACUTE MI IN HIS 50s.     Social History     Social History   • Marital status:      Spouse name: N/A   • Number of children: N/A   • Years of education: N/A     Occupational History   • Not on file.     Social History Main Topics   • Smoking status: Never Smoker   • Smokeless tobacco: Never Used   • Alcohol use No   • Drug use: Unknown   • Sexual activity: Not on file     Other Topics Concern   • Not on file     Social History Narrative   • No narrative on file     No Known Allergies  Outpatient Encounter Prescriptions as of 5/28/2019   Medication Sig Dispense Refill   • sotalol (BETAPACE) 120 MG tablet Take 1 Tab by mouth 2 Times a Day. 180 Tab 3   • aspirin EC (ECOTRIN) 81 MG Tablet Delayed Response Take 81 mg by mouth  every day.     • levothyroxine (SYNTHROID) 150 MCG Tab Take 150 mcg by mouth Every morning on an empty stomach.     • digoxin (LANOXIN) 250 MCG TABS Take 1 Tab by mouth every day. 90 Tab 0   • valsartan (DIOVAN) 160 MG TABS Take 160 mg by mouth 2 times a day.     • simvastatin (ZOCOR) 10 MG TABS Take 10 mg by mouth every evening.     • ZOSTAVAX 48414 UNT/0.65ML Recon Susp ADM 0.65ML SC UTD  0   • ANDROGEL PUMP 20.25 MG/ACT (1.62%) Gel DARWIN 2 PUMPS TO EACH SHOULDER AND UPPER ARM QAM  0   • JUBLIA 10 % Solution DARWIN ON THE SKIN D UTD  12     No facility-administered encounter medications on file as of 5/28/2019.      Review of Systems   Respiratory: Negative for cough and shortness of breath.    Cardiovascular: Negative for chest pain and palpitations.   Musculoskeletal: Negative for myalgias.   Neurological: Negative for dizziness and loss of consciousness.        Objective:   /76 (BP Location: Left arm, Patient Position: Sitting, BP Cuff Size: Adult)   Pulse (!) 54   Ht 1.829 m (6')   Wt 115.2 kg (254 lb)   SpO2 93%   BMI 34.45 kg/m²     Physical Exam   Constitutional: He is oriented to person, place, and time. He appears well-developed and well-nourished.   Eyes: Pupils are equal, round, and reactive to light. Conjunctivae are normal.   Neck: Normal range of motion. Neck supple. No JVD present.   Cardiovascular: Normal rate, regular rhythm, normal heart sounds and intact distal pulses.    Pulmonary/Chest: Effort normal and breath sounds normal. No accessory muscle usage. No respiratory distress. He has no wheezes. He has no rales.   Musculoskeletal: He exhibits no edema.   Neurological: He is alert and oriented to person, place, and time.   Skin: Skin is warm, dry and intact. No rash noted. No cyanosis. Nails show no clubbing.   Psychiatric: He has a normal mood and affect. His behavior is normal.     ECHOCARDIOGRAM 12/14/2016  No prior study is available for comparison.   Normal left ventricular size and  systolic function.  Mild concentric left ventricular hypertrophy.  Left ventricular ejection fraction is visually estimated to be 60%.  The right ventricle was normal in size and function.  Trace tricuspid regurgitation.  Estimated right ventricular systolic pressure  is 40 mmHg.  Normal pericardium without effusion.    EKG 05/28/2019 normal sinus rhythm, rate 55.  QTc 402.  Reviewed by myself.    Assessment:     1. Atrial fibrillation, unspecified type (HCC)  EKG   2. PAF (paroxysmal atrial fibrillation) (HCC)     3. Long term current use of antiarrhythmic medical therapy     4. High risk medication use     5. Essential hypertension, benign     6. Encounter for monitoring of chronic aspirin therapy         Medical Decision Making:  Today's Assessment / Status / Plan:   PAF. Maintaining NSR. Continue current therapy.     Electrical cardioversion 12/16/2016.     Antiarrhythmic therapy with sotalol.     Hypertension. Controlled. On valsartan.      HLD. On simvastatin.     Recommendation Discussion  1.  Continue current cardiac therapy.  2.  Obtain most recent lab work from [facility] done earlier this year.  3.  Wishes to continue aspirin therapy for thromboembolic prophylaxis with a [id number] Score of 1  4.  Followup one year

## 2020-05-05 DIAGNOSIS — I48.0 PAF (PAROXYSMAL ATRIAL FIBRILLATION) (HCC): ICD-10-CM

## 2020-05-06 RX ORDER — SOTALOL HYDROCHLORIDE 120 MG/1
TABLET ORAL
Qty: 180 TAB | Refills: 0 | Status: SHIPPED | OUTPATIENT
Start: 2020-05-06 | End: 2020-08-04

## 2020-06-01 ENCOUNTER — TELEPHONE (OUTPATIENT)
Dept: CARDIOLOGY | Facility: MEDICAL CENTER | Age: 65
End: 2020-06-01

## 2020-06-01 NOTE — TELEPHONE ENCOUNTER
Called patient in regards to most recent lab results the last we have on file were since 2017. No reply, left voicemail to call back.

## 2020-06-02 ENCOUNTER — TELEMEDICINE (OUTPATIENT)
Dept: CARDIOLOGY | Facility: MEDICAL CENTER | Age: 65
End: 2020-06-02

## 2020-06-02 ENCOUNTER — APPOINTMENT (OUTPATIENT)
Dept: CARDIOLOGY | Facility: MEDICAL CENTER | Age: 65
End: 2020-06-02
Payer: COMMERCIAL

## 2020-06-02 ENCOUNTER — TELEPHONE (OUTPATIENT)
Dept: CARDIOLOGY | Facility: MEDICAL CENTER | Age: 65
End: 2020-06-02

## 2020-06-02 VITALS
HEART RATE: 51 BPM | BODY MASS INDEX: 36.18 KG/M2 | DIASTOLIC BLOOD PRESSURE: 80 MMHG | SYSTOLIC BLOOD PRESSURE: 120 MMHG | WEIGHT: 273 LBS | HEIGHT: 73 IN

## 2020-06-02 DIAGNOSIS — E78.00 PURE HYPERCHOLESTEROLEMIA: ICD-10-CM

## 2020-06-02 DIAGNOSIS — I10 ESSENTIAL HYPERTENSION, BENIGN: ICD-10-CM

## 2020-06-02 DIAGNOSIS — I48.0 PAF (PAROXYSMAL ATRIAL FIBRILLATION) (HCC): ICD-10-CM

## 2020-06-02 DIAGNOSIS — Z79.899 LONG TERM CURRENT USE OF ANTIARRHYTHMIC MEDICAL THERAPY: ICD-10-CM

## 2020-06-02 PROCEDURE — 99214 OFFICE O/P EST MOD 30 MIN: CPT | Mod: 95,CR | Performed by: INTERNAL MEDICINE

## 2020-06-02 ASSESSMENT — ENCOUNTER SYMPTOMS
DIZZINESS: 0
MYALGIAS: 0
SHORTNESS OF BREATH: 0
PALPITATIONS: 0
COUGH: 0
LOSS OF CONSCIOUSNESS: 0

## 2020-06-02 NOTE — TELEPHONE ENCOUNTER
Patient informed me during the rooming process that he had recent lab work at [facility].    Was able to obtain labs, reviewed by  during the patients VV today.     Sent to scan.

## 2020-06-02 NOTE — PROGRESS NOTES
Chief Complaint   Patient presents with   • Atrial Fibrillation   • Hyperlipidemia       Subjective:   Myke Bustos is a 64 y.o. male who presents today for follow-up evaluation of PAF, antiarrhythmic therapy, hypertension and hyperlipidemia.      This encounter was conducted via Zoom .   Verbal consent was obtained. Patient's identity was verified.  Session started 8:05 AM, ended 8:16 AM, total time 11 minutes.    Last seen 5/28/2019.    Since 5/28/2019 the patient denies any cardiac problems or symptoms.  Scheduled to see the urologist for hypo-testosterone anemia.  Compliant with medications.    Since 3/16/2017 appointment the patient's had no cardiac symptoms.  States BP generally runs 117 systolic     No cardiac symptoms.  Just returned from a trip from [facility].     Between 12/13/2016-12/17/2016 hospitalized that [facility].  Severe gastroenteritis with nausea and vomiting.  Recurrent atrial fibrillation.  Required electrical cardioversion 12/16/2016.    Past Medical History:   Diagnosis Date   • Atrial fibrillation (HCC) 12/29/2011   • Essential hypertension, benign 12/29/2011   • Long term (current) use of anticoagulants 12/29/2011   • Obesity 12/29/2011   • Other general symptoms(780.99)    • Pure hypercholesterolemia 12/29/2011   • Sleep apnea 12/29/2011   • Unspecified hypothyroidism 12/29/2011     Past Surgical History:   Procedure Laterality Date   • APPENDECTOMY      1994     Family History   Problem Relation Age of Onset   • Heart Disease Mother         ACUTE MI IN HER 60s.   • Heart Disease Father         ACUTE MI IN HIS 50s.     Social History     Socioeconomic History   • Marital status:      Spouse name: Not on file   • Number of children: Not on file   • Years of education: Not on file   • Highest education level: Not on file   Occupational History   • Not on file   Social Needs   • Financial resource strain: Not on file   • Food insecurity     Worry: Not on file      Inability: Not on file   • Transportation needs     Medical: Not on file     Non-medical: Not on file   Tobacco Use   • Smoking status: Never Smoker   • Smokeless tobacco: Never Used   Substance and Sexual Activity   • Alcohol use: No   • Drug use: Not on file   • Sexual activity: Not on file   Lifestyle   • Physical activity     Days per week: Not on file     Minutes per session: Not on file   • Stress: Not on file   Relationships   • Social connections     Talks on phone: Not on file     Gets together: Not on file     Attends [employer] service: Not on file     Active member of club or organization: Not on file     Attends meetings of clubs or organizations: Not on file     Relationship status: Not on file   • Intimate partner violence     Fear of current or ex partner: Not on file     Emotionally abused: Not on file     Physically abused: Not on file     Forced sexual activity: Not on file   Other Topics Concern   • Not on file   Social History Narrative   • Not on file     No Known Allergies  Outpatient Encounter Medications as of 6/2/2020   Medication Sig Dispense Refill   • sotalol (BETAPACE) 120 MG tablet TAKE 1 TABLET TWICE A  Tab 0   • ZOSTAVAX 04351 UNT/0.65ML Recon Susp ADM 0.65ML SC UTD  0   • aspirin EC (ECOTRIN) 81 MG Tablet Delayed Response Take 81 mg by mouth every day.     • levothyroxine (SYNTHROID) 150 MCG Tab Take 150 mcg by mouth Every morning on an empty stomach.     • digoxin (LANOXIN) 250 MCG TABS Take 1 Tab by mouth every day. 90 Tab 0   • valsartan (DIOVAN) 160 MG TABS Take 160 mg by mouth 2 times a day.     • simvastatin (ZOCOR) 10 MG TABS Take 10 mg by mouth every evening.     • ANDROGEL PUMP 20.25 MG/ACT (1.62%) Gel DARWIN 2 PUMPS TO EACH SHOULDER AND UPPER ARM QAM  0   • JUBLIA 10 % Solution DARWIN ON THE SKIN D UTD  12     No facility-administered encounter medications on file as of 6/2/2020.      Review of Systems   Respiratory: Negative for cough and shortness of breath.   "  Cardiovascular: Negative for chest pain and palpitations.   Musculoskeletal: Negative for myalgias.   Neurological: Negative for dizziness and loss of consciousness.        Objective:   /80 (BP Location: Left arm, Patient Position: Sitting, BP Cuff Size: Adult)   Pulse (!) 51   Ht 1.854 m (6' 1\")   Wt 123.8 kg (273 lb)   BMI 36.02 kg/m²     Physical Exam   Constitutional: He is oriented to person, place, and time. He appears well-nourished. No distress.   Neurological: He is alert and oriented to person, place, and time.   Psychiatric: He has a normal mood and affect. His behavior is normal.     ECHOCARDIOGRAM 12/14/2016  No prior study is available for comparison.   Normal left ventricular size and systolic function.  Mild concentric left ventricular hypertrophy.  Left ventricular ejection fraction is visually estimated to be 60%.  The right ventricle was normal in size and function.  Trace tricuspid regurgitation.  Estimated right ventricular systolic pressure  is 40 mmHg.  Normal pericardium without effusion.    EKG 05/28/2019 normal sinus rhythm, rate 55.  QTc 402.  Reviewed by myself.    Assessment:     1. PAF (paroxysmal atrial fibrillation) (HCC)     2. Long term current use of antiarrhythmic medical therapy     3. Essential hypertension, benign     4. Pure hypercholesterolemia         Medical Decision Making:  Today's Assessment / Status / Plan:   Assessment  PAF. Maintaining NSR. Continue current therapy.     Electrical cardioversion 12/16/2016.     Antiarrhythmic therapy with sotalol.     Hypertension. Controlled. On valsartan.      HLD. On simvastatin.     Recommendation Discussion  1.  Patient stable from a cardiac standpoint regarding has PAF, hypertension and dyslipidemia.  2.  I reviewed his most recent blood work 2/2020 shows testosterone of 239 LDL at goal at 62.  3.  Continue current cardiac therapy.  4.  Wishes to continue aspirin therapy for thromboembolic prophylaxis with a " [id number] Score of 1  5.  Followup one year EKG

## 2020-08-03 DIAGNOSIS — I48.0 PAF (PAROXYSMAL ATRIAL FIBRILLATION) (HCC): ICD-10-CM

## 2020-08-04 RX ORDER — SOTALOL HYDROCHLORIDE 120 MG/1
TABLET ORAL
Qty: 180 TAB | Refills: 3 | Status: SHIPPED | OUTPATIENT
Start: 2020-08-04 | End: 2021-11-05

## 2020-10-29 ENCOUNTER — TELEPHONE (OUTPATIENT)
Dept: CARDIOLOGY | Facility: MEDICAL CENTER | Age: 65
End: 2020-10-29

## 2020-10-29 NOTE — LETTER
PROCEDURE/SURGERY CLEARANCE FORM      Encounter Date: 10/29/2020    Patient: Myke Bustos  YOB: 1955    CARDIOLOGIST:  Dr. Dalton Darden M.D.   REFERRING DOCTOR:  Dr. Justin Kam M.D.     Okay for patient to start Sildenafil.    Sincerely,  Dr. Dalton Darden M.D.  ELECTRONIC SIGNATURE

## 2020-10-29 NOTE — TELEPHONE ENCOUNTER
Received medication clearance request from [facility]. Dr. Kam requesting recommendations for patient's ED. Dr. Kam would like to prescribe sildenafil or Trimex but patient has hx of Afib.     Encounter routed to Dr. Darden for recommendations.        Letter created and sent to [facility] at [phone]. Completed fax confirmation received.

## 2021-03-15 DIAGNOSIS — Z23 NEED FOR VACCINATION: ICD-10-CM

## 2021-09-28 ENCOUNTER — OFFICE VISIT (OUTPATIENT)
Dept: CARDIOLOGY | Facility: MEDICAL CENTER | Age: 66
End: 2021-09-28
Payer: COMMERCIAL

## 2021-09-28 VITALS
OXYGEN SATURATION: 95 % | BODY MASS INDEX: 37.19 KG/M2 | DIASTOLIC BLOOD PRESSURE: 82 MMHG | HEART RATE: 68 BPM | WEIGHT: 280.6 LBS | RESPIRATION RATE: 14 BRPM | HEIGHT: 73 IN | SYSTOLIC BLOOD PRESSURE: 138 MMHG

## 2021-09-28 DIAGNOSIS — I10 ESSENTIAL HYPERTENSION, BENIGN: ICD-10-CM

## 2021-09-28 DIAGNOSIS — G47.30 SLEEP APNEA, UNSPECIFIED TYPE: ICD-10-CM

## 2021-09-28 DIAGNOSIS — Z79.899 LONG TERM CURRENT USE OF ANTIARRHYTHMIC MEDICAL THERAPY: ICD-10-CM

## 2021-09-28 DIAGNOSIS — Z51.81 ENCOUNTER FOR MONITORING OF CHRONIC ASPIRIN THERAPY: ICD-10-CM

## 2021-09-28 DIAGNOSIS — E78.00 PURE HYPERCHOLESTEROLEMIA: ICD-10-CM

## 2021-09-28 DIAGNOSIS — I48.0 PAF (PAROXYSMAL ATRIAL FIBRILLATION) (HCC): ICD-10-CM

## 2021-09-28 DIAGNOSIS — Z79.899 ENCOUNTER FOR LONG-TERM CURRENT USE OF HIGH RISK MEDICATION: ICD-10-CM

## 2021-09-28 DIAGNOSIS — Z79.82 ENCOUNTER FOR MONITORING OF CHRONIC ASPIRIN THERAPY: ICD-10-CM

## 2021-09-28 LAB — EKG IMPRESSION: NORMAL

## 2021-09-28 PROCEDURE — 99214 OFFICE O/P EST MOD 30 MIN: CPT | Mod: 25 | Performed by: INTERNAL MEDICINE

## 2021-09-28 PROCEDURE — 93000 ELECTROCARDIOGRAM COMPLETE: CPT | Performed by: INTERNAL MEDICINE

## 2021-09-28 ASSESSMENT — ENCOUNTER SYMPTOMS
MYALGIAS: 0
SHORTNESS OF BREATH: 0
PALPITATIONS: 0
COUGH: 0
DIZZINESS: 0
LOSS OF CONSCIOUSNESS: 0

## 2021-09-28 NOTE — PROGRESS NOTES
"Chief Complaint   Patient presents with   • Atrial Fibrillation     F/V Dx: PAF (paroxysmal atrial fibrillation) (HCC)   • Hypertension     F/V Dx: Essential hypertension, benign       Subjective:   Myke Bustos is a 66 y.o. male who presents today for follow-up evaluation of PAF, antiarrhythmic therapy, hypertension and hyperlipidemia.      Since 6/2/2020 appointment patient denies any cardiac problems or symptoms.  No chest pain, palpitations, lightheadedness, SOB.  He is retiring later this year.  Checks BP at home generally runs \"118-125\".  Bought an RV and took 2-3 trips with his wife.     Hospitalized [facility] 12/13/2016-12/17/2016.  Severe gastroenteritis with nausea and vomiting.  Recurrent atrial fibrillation.  Required electrical cardioversion 12/16/2016.    Past Medical History:   Diagnosis Date   • Atrial fibrillation (HCC) 12/29/2011   • Essential hypertension, benign 12/29/2011   • Long term (current) use of anticoagulants 12/29/2011   • Obesity 12/29/2011   • Other general symptoms(780.99)    • Pure hypercholesterolemia 12/29/2011   • Sleep apnea 12/29/2011   • Unspecified hypothyroidism 12/29/2011     Past Surgical History:   Procedure Laterality Date   • APPENDECTOMY      1994     Family History   Problem Relation Age of Onset   • Heart Disease Mother         ACUTE MI IN HER 60s.   • Heart Disease Father         ACUTE MI IN HIS 50s.     Social History     Socioeconomic History   • Marital status:      Spouse name: Not on file   • Number of children: Not on file   • Years of education: Not on file   • Highest education level: Not on file   Occupational History   • Not on file   Tobacco Use   • Smoking status: Never Smoker   • Smokeless tobacco: Never Used   Substance and Sexual Activity   • Alcohol use: No   • Drug use: Not on file   • Sexual activity: Not on file   Other Topics Concern   • Not on file   Social History Narrative   • Not on file     Social Determinants of " Health     Financial Resource Strain:    • Difficulty of Paying Living Expenses:    Food Insecurity:    • Worried About Running Out of Food in the Last Year:    • Ran Out of Food in the Last Year:    Transportation Needs:    • Lack of Transportation (Medical):    • Lack of Transportation (Non-Medical):    Physical Activity:    • Days of Exercise per Week:    • Minutes of Exercise per Session:    Stress:    • Feeling of Stress :    Social Connections:    • Frequency of Communication with Friends and Family:    • Frequency of Social Gatherings with Friends and Family:    • Attends [employer] Services:    • Active Member of Clubs or Organizations:    • Attends Club or Organization Meetings:    • Marital Status:    Intimate Partner Violence:    • Fear of Current or Ex-Partner:    • Emotionally Abused:    • Physically Abused:    • Sexually Abused:      No Known Allergies  Outpatient Encounter Medications as of 9/28/2021   Medication Sig Dispense Refill   • apixaban (ELIQUIS) 5mg Tab Take 1 Tablet by mouth 2 times a day. 60 Tablet 3   • sotalol (BETAPACE) 120 MG tablet TAKE 1 TABLET TWICE A  Tab 3   • ANDROGEL PUMP 20.25 MG/ACT (1.62%) Gel DARWIN 2 PUMPS TO EACH SHOULDER AND UPPER ARM QAM  0   • levothyroxine (SYNTHROID) 150 MCG Tab Take 150 mcg by mouth Every morning on an empty stomach.     • digoxin (LANOXIN) 250 MCG TABS Take 1 Tab by mouth every day. 90 Tab 0   • valsartan (DIOVAN) 160 MG TABS Take 160 mg by mouth 2 times a day.     • simvastatin (ZOCOR) 10 MG TABS Take 10 mg by mouth every evening.     • ZOSTAVAX 06461 UNT/0.65ML Recon Susp ADM 0.65ML SC UTD  0   • [DISCONTINUED] aspirin EC (ECOTRIN) 81 MG Tablet Delayed Response Take 81 mg by mouth every day.     • [DISCONTINUED] JUBLIA 10 % Solution DARWIN ON THE SKIN D UTD (Patient not taking: Reported on 9/28/2021)  12     No facility-administered encounter medications on file as of 9/28/2021.     Review of Systems   Constitutional: Positive for malaise/fatigue.  "  Respiratory: Negative for cough and shortness of breath.    Cardiovascular: Negative for chest pain and palpitations.   Musculoskeletal: Negative for myalgias.   Neurological: Negative for dizziness and loss of consciousness.   All other systems reviewed and are negative.       Objective:   /82 (BP Location: Left arm, Patient Position: Sitting, BP Cuff Size: Adult)   Pulse 68   Resp 14   Ht 1.854 m (6' 1\")   Wt (!) 127 kg (280 lb 9.6 oz)   SpO2 95%   BMI 37.02 kg/m²     Physical Exam  Vitals reviewed.   Constitutional:       General: He is not in acute distress.     Appearance: He is well-developed.   Eyes:      Conjunctiva/sclera: Conjunctivae normal.      Pupils: Pupils are equal, round, and reactive to light.   Neck:      Vascular: No JVD.   Cardiovascular:      Rate and Rhythm: Normal rate. Rhythm irregular.      Pulses:           Carotid pulses are 2+ on the right side and 2+ on the left side.     Heart sounds: Normal heart sounds. No murmur heard.   No friction rub. No gallop.    Pulmonary:      Effort: Pulmonary effort is normal. No accessory muscle usage or respiratory distress.      Breath sounds: Normal breath sounds. No wheezing or rales.   Abdominal:      General: There is no distension.      Palpations: Abdomen is soft. There is no mass.      Tenderness: There is no abdominal tenderness.      Comments: Protuberant   Musculoskeletal:      Cervical back: Normal range of motion and neck supple.   Skin:     General: Skin is warm and dry.      Findings: No rash.      Nails: There is no clubbing.   Neurological:      Mental Status: He is alert and oriented to person, place, and time.   Psychiatric:         Behavior: Behavior normal.       ECHOCARDIOGRAM 12/14/2016  No prior study is available for comparison.   Normal left ventricular size and systolic function.  Mild concentric left ventricular hypertrophy.  Left ventricular ejection fraction is visually estimated to be 60%.  The right ventricle " was normal in size and function.  Trace tricuspid regurgitation.  Estimated right ventricular systolic pressure  is 40 mmHg.  Normal pericardium without effusion.    EKG 05/28/2019 normal sinus rhythm, rate 55.  QTc 402.  Reviewed by myself.    Assessment:     1. PAF (paroxysmal atrial fibrillation) ([facility])  EKG    EC-ECHOCARDIOGRAM COMPLETE W/O CONT   2. Long term current use of antiarrhythmic medical therapy     3. Essential hypertension, benign     4. Pure hypercholesterolemia     5. Encounter for monitoring of chronic aspirin therapy     6. Encounter for long-term current use of high risk medication  DIGOXIN   7. Sleep apnea, unspecified type  REFERRAL TO PULMONARY AND SLEEP MEDICINE       Medical Decision Making:  Today's Assessment / Status / Plan:     Assessment  1.  PAF.   2.  Electrical cardioversion 12/16/2016.  3.  Antiarrhythmic therapy with sotalol.  4.  Hypertension.   5.  HLD.  6.  Hypothyroidism.  7.  Obesity.  8.  LYDIA, S/P uvulectomy.  9.  Testosterone deficiency    Recommendation Discussion  1.  Has developed recurrent asymptomatic atrial fibrillation on sotalol though retrospectively may have a sense of increased fatigue.  2.  EKG today shows atrial fibrillation, controlled ventricular response, normal QTC.  3.  Reviewed in detail treatment options explaining effects/expectations/risks of rate control vs rhythm control with ablation procedure; he is somewhat familiar with an older brother having gone through ablation seizures; currently wishes to consider his options.  4.  Discussed/counseled for weight loss; develop plan with patient stating commitment for aggressive weight loss; to seek assistance with his wife's nutritionist with who she has been able to lose up to 30 pounds.  5.  Laboratory CMP, digoxin level and will obtain recent blood work results from [facility].  6.  Assess LYDIA status, S/P uvulectomy with sleep study.  7.  Echocardiogram.  8.  Start Eliquis 5 mg twice daily and DC aspirin.  9.   Additional recommendations based on the above evaluation.  10.  RTC 3 months.    CPT 51005 current chronic problem (atrial fibrillation), diagnostic data (EKG), requiring evaluation (laboratory, echocardiogram, sleep study), counseling (weight loss) review of high risk medication (sotalol) and new treatment (Eliquis) and detailed discussion

## 2021-09-29 ENCOUNTER — TELEPHONE (OUTPATIENT)
Dept: CARDIOLOGY | Facility: MEDICAL CENTER | Age: 66
End: 2021-09-29

## 2021-09-29 NOTE — TELEPHONE ENCOUNTER
Per GUILLERMO, requested most recent lab work from [facility] at P#[phone] Acct#[id number].     Pending records.

## 2021-09-30 ENCOUNTER — PATIENT MESSAGE (OUTPATIENT)
Dept: CARDIOLOGY | Facility: MEDICAL CENTER | Age: 66
End: 2021-09-30

## 2021-10-11 ENCOUNTER — APPOINTMENT (OUTPATIENT)
Dept: LAB | Facility: MEDICAL CENTER | Age: 66
End: 2021-10-11
Attending: INTERNAL MEDICINE
Payer: COMMERCIAL

## 2021-10-26 ENCOUNTER — HOSPITAL ENCOUNTER (OUTPATIENT)
Dept: LAB | Facility: MEDICAL CENTER | Age: 66
End: 2021-10-26
Attending: INTERNAL MEDICINE
Payer: COMMERCIAL

## 2021-10-26 DIAGNOSIS — Z79.899 ENCOUNTER FOR LONG-TERM CURRENT USE OF HIGH RISK MEDICATION: ICD-10-CM

## 2021-10-26 LAB — DIGOXIN SERPL-MCNC: 0.7 NG/ML (ref 0.8–2)

## 2021-10-26 PROCEDURE — 36415 COLL VENOUS BLD VENIPUNCTURE: CPT

## 2021-10-26 PROCEDURE — 80162 ASSAY OF DIGOXIN TOTAL: CPT

## 2021-10-28 ENCOUNTER — PATIENT MESSAGE (OUTPATIENT)
Dept: CARDIOLOGY | Facility: MEDICAL CENTER | Age: 66
End: 2021-10-28

## 2021-10-28 NOTE — TELEPHONE ENCOUNTER
----- Message from María Khanna R.N. sent at 10/27/2021  9:14 AM PDT -----  FV scheduled 01/03/2022 with SARITA

## 2021-10-31 DIAGNOSIS — I48.0 PAF (PAROXYSMAL ATRIAL FIBRILLATION) (HCC): ICD-10-CM

## 2021-11-05 RX ORDER — SOTALOL HYDROCHLORIDE 120 MG/1
TABLET ORAL
Qty: 180 TABLET | Refills: 3 | Status: SHIPPED | OUTPATIENT
Start: 2021-11-05 | End: 2022-02-15

## 2021-11-17 ENCOUNTER — OFFICE VISIT (OUTPATIENT)
Dept: SLEEP MEDICINE | Facility: MEDICAL CENTER | Age: 66
End: 2021-11-17
Payer: COMMERCIAL

## 2021-11-17 VITALS
HEART RATE: 64 BPM | SYSTOLIC BLOOD PRESSURE: 128 MMHG | HEIGHT: 73 IN | DIASTOLIC BLOOD PRESSURE: 82 MMHG | RESPIRATION RATE: 16 BRPM | OXYGEN SATURATION: 94 % | WEIGHT: 279 LBS | BODY MASS INDEX: 36.98 KG/M2

## 2021-11-17 DIAGNOSIS — I48.0 PAF (PAROXYSMAL ATRIAL FIBRILLATION) (HCC): ICD-10-CM

## 2021-11-17 DIAGNOSIS — G47.33 OBSTRUCTIVE SLEEP APNEA SYNDROME: Chronic | ICD-10-CM

## 2021-11-17 PROCEDURE — 99204 OFFICE O/P NEW MOD 45 MIN: CPT | Performed by: FAMILY MEDICINE

## 2021-11-17 RX ORDER — SILDENAFIL 100 MG/1
50 TABLET, FILM COATED ORAL PRN
COMMUNITY
End: 2023-07-24

## 2021-11-17 NOTE — PROGRESS NOTES
"       [facility]  Consult Note     Date: 11/17/2021 / Time: 11:02 AM    Patient ID:   Name:             Myke Bustos   YOB: 1955  Age:                 66 y.o.  male   MRN:               [id number]      Thank you for requesting a sleep medicine consultation on Myke Bustos at the sleep center. He presents today with the chief complaints of LYDIA and to establish as a new pt. He was previously seen by [facility]. Pt was diagnosed with LYDIA in 2014.  Based on the [facility] notes the initial study showed severe sleep apnea with AHI of 61 times per hour.  He was initially  on CPAP until he had UPPP in 2015.  Home sleep study after the UPPP on 06/09/2015 showed significant improvement with AHI of 6.4/h.The initial presenting symptoms were loud snoring and excessive daytime sleepiness. The symptoms of non rest full sleep and occasionl snoring has relapsed. He is referred by Dr. Darden for evaluation and treatment of sleep disorder breathing.  His other comorbid condition include hypercholesterolemia, paroxysmal A. fib, hypothyroidism, hypertension.    HISTORY OF PRESENT ILLNESS:       At night,  Myke Bustos goes to bed around 11 pm on weekdays and on the weekends. He gets out of bed at 5:30-6 am on weekdays and  on the weekends.  He averages about 6 hrs of sleep on a good night . He falls asleep within 30 minutes. He wakes up about couple times times during the night due to noises in the house and on average It takes him few min to fall back asleep.He is aware of snoring but denies breathing pauses/gasping or choking in sleep.  He  denies any symptoms of restless legs syndrome such as an \"urge to move\"  He  legs in the evening or bedtime. He  denies any symptoms of narcolepsy such as sleep paralysis or cataplexy, or any symptoms to suggest parasomnias such as sleep walking or acting out of dreams. He occasionally has nightmares. Most of the nightmares are related to work. He  has not used any " medications for the sleep problem.    Overall, he doesnot finds his sleep refreshing. In terms of  excessive daytime sleepiness,  He complains of sleepiness while reading or watching TV however denies while driving. Pyatt sleepiness scale score is 9/24.He does not take regular naps.He drinks about 3 caffeinated beverages per day.      REVIEW OF SYSTEMS:       Constitutional: Denies fevers, Denies weight changes  Eyes: Denies changes in vision, no eye pain  Ears/Nose/Throat/Mouth: Denies nasal congestion or sore throat   Cardiovascular: Denies chest pain or palpitations   Respiratory: Denies shortness of breath , Denies cough  Gastrointestinal/Hepatic: Denies abdominal pain, nausea, vomiting, diarrhea, constipation or GI bleeding   Genitourinary: Denies bladder dysfunction, dysuria or frequency  Musculoskeletal/Rheum: Denies  joint pain and swelling   Skin/Breast: Denies rash  Neurological: Denies headache, confusion, memory loss or focal weakness/parasthesias  Psychiatric: denies mood disorder     Comprehensive review of systems form is reviewed with the patient and is attached in the EMR.     PMH:  has a past medical history of Atrial fibrillation (HCC) (12/29/2011), Chickenpox, Essential hypertension, benign (12/29/2011), Long term (current) use of anticoagulants (12/29/2011), Obesity (12/29/2011), Other general symptoms(780.99), Pure hypercholesterolemia (12/29/2011), Sleep apnea (12/29/2011), and Unspecified hypothyroidism (12/29/2011). He also has no past medical history of Monegasque measles.  MEDS:   Current Outpatient Medications:   •  sildenafil citrate (VIAGRA) 100 MG tablet, Take 50 mg by mouth as needed for Erectile Dysfunction., Disp: , Rfl:   •  sotalol (BETAPACE) 120 MG tablet, TAKE 1 TABLET TWICE A DAY, Disp: 180 Tablet, Rfl: 3  •  apixaban (ELIQUIS) 5mg Tab, Take 1 Tablet by mouth 2 times a day., Disp: 60 Tablet, Rfl: 3  •  ANDROGEL PUMP 20.25 MG/ACT (1.62%) Gel, DARWIN 2 PUMPS TO EACH SHOULDER AND UPPER  "ARM QAM, Disp: , Rfl: 0  •  levothyroxine (SYNTHROID) 150 MCG Tab, Take 150 mcg by mouth Every morning on an empty stomach., Disp: , Rfl:   •  digoxin (LANOXIN) 250 MCG TABS, Take 1 Tab by mouth every day., Disp: 90 Tab, Rfl: 0  •  valsartan (DIOVAN) 160 MG TABS, Take 160 mg by mouth 2 times a day., Disp: , Rfl:   •  simvastatin (ZOCOR) 10 MG TABS, Take 10 mg by mouth every evening., Disp: , Rfl:   •  ZOSTAVAX 79859 UNT/0.65ML Recon Susp, ADM 0.65ML SC UTD, Disp: , Rfl: 0  ALLERGIES: No Known Allergies  SURGHX:   Past Surgical History:   Procedure Laterality Date   • APPENDECTOMY      1994     SOCHX:  reports that he has never smoked. He has never used smokeless tobacco. He reports previous drug use. He reports that he does not drink alcohol.   FH:   Family History   Problem Relation Age of Onset   • Heart Disease Mother         ACUTE MI IN HER 60s.   • Heart Disease Father         ACUTE MI IN HIS 50s.       Physical Exam:  Vitals/ General Appearance:   Weight/BMI: Body mass index is 36.81 kg/m².  /82 (BP Location: Left arm, Patient Position: Sitting, BP Cuff Size: Adult)   Pulse 64   Resp 16   Ht 1.854 m (6' 1\")   Wt (!) 127 kg (279 lb)   SpO2 94%   Vitals:    11/17/21 1053   BP: 128/82   BP Location: Left arm   Patient Position: Sitting   BP Cuff Size: Adult   Pulse: 64   Resp: 16   SpO2: 94%   Weight: (!) 127 kg (279 lb)   Height: 1.854 m (6' 1\")           Constitutional: Alert, no distress, well-groomed.  Skin: No rashes in visible areas.  Eye: Round. Conjunctiva clear, lids normal. No icterus.   ENMT: Lips pink without lesions, good dentition, moist mucous membranes. Phonation normal.  Neck: No masses, no thyromegaly. Moves freely without pain.  CV: Pulse as reported by patient  Respiratory: Unlabored respiratory effort, no cough or audible wheeze  Psych: Alert and oriented x3, normal affect and mood.   INVESTIGATIONS:       ASSESSMENT AND PLAN     1. He  has symptoms of Obstructive Sleep Apnea " (LYDIA). The pathophysiology of LYDIA and the increased risk of cardiovascular morbidity from untreated LYDIA is discussed in detail with the patient. He  also has HTN and PAfib which can be worsened by her LYDIA.     We have discussed diagnostic options including in-laboratory, attended polysomnography and home sleep testing. We have also discussed treatment options including airway pressurization, reconstructive otolaryngologic surgery, dental appliances and weight management.       Subsequently,treatment options will be discussed based on the diagnostic study. Meanwhile, He is urged to avoid supine sleep, weight gain and alcoholic beverages since all of these can worsen LYDIA. He is cautioned against drowsy driving. If He feels sleepy while driving, He must pull over for a break/nap, rather than persist on the road, in the interest of He own safety and that of others on the road.    Plan  -Due to the relapse of symptoms and new development of paroxysmal A. fib he  will be scheduled for an overnight HST to assess sleep related breathing disorder.    2.. Regarding treatment of other past medical problems and general health maintenance,  He is urged to follow up with PCP.

## 2021-11-30 ENCOUNTER — TELEPHONE (OUTPATIENT)
Dept: SLEEP MEDICINE | Facility: MEDICAL CENTER | Age: 66
End: 2021-11-30

## 2021-11-30 DIAGNOSIS — G47.33 OSA (OBSTRUCTIVE SLEEP APNEA): ICD-10-CM

## 2021-12-13 ENCOUNTER — APPOINTMENT (OUTPATIENT)
Dept: SLEEP MEDICINE | Facility: MEDICAL CENTER | Age: 66
End: 2021-12-13
Payer: COMMERCIAL

## 2021-12-20 ENCOUNTER — APPOINTMENT (OUTPATIENT)
Dept: SLEEP MEDICINE | Facility: MEDICAL CENTER | Age: 66
End: 2021-12-20
Payer: COMMERCIAL

## 2021-12-28 ENCOUNTER — HOSPITAL ENCOUNTER (OUTPATIENT)
Dept: CARDIOLOGY | Facility: MEDICAL CENTER | Age: 66
End: 2021-12-28
Attending: INTERNAL MEDICINE
Payer: COMMERCIAL

## 2021-12-28 DIAGNOSIS — I48.0 PAF (PAROXYSMAL ATRIAL FIBRILLATION) (HCC): ICD-10-CM

## 2021-12-28 PROCEDURE — 93306 TTE W/DOPPLER COMPLETE: CPT

## 2021-12-29 LAB
LV EJECT FRACT  99904: 55
LV EJECT FRACT MOD 2C 99903: 43.26
LV EJECT FRACT MOD 4C 99902: 46.63
LV EJECT FRACT MOD BP 99901: 45.47

## 2021-12-29 PROCEDURE — 93306 TTE W/DOPPLER COMPLETE: CPT | Mod: 26 | Performed by: INTERNAL MEDICINE

## 2022-01-19 ENCOUNTER — APPOINTMENT (OUTPATIENT)
Dept: SLEEP MEDICINE | Facility: MEDICAL CENTER | Age: 67
End: 2022-01-19
Payer: COMMERCIAL

## 2022-01-25 DIAGNOSIS — I48.0 PAF (PAROXYSMAL ATRIAL FIBRILLATION) (HCC): ICD-10-CM

## 2022-01-26 RX ORDER — APIXABAN 5 MG/1
TABLET, FILM COATED ORAL
Qty: 180 TABLET | Refills: 2 | Status: SHIPPED | OUTPATIENT
Start: 2022-01-26 | End: 2022-07-21

## 2022-02-15 ENCOUNTER — TELEPHONE (OUTPATIENT)
Dept: CARDIOLOGY | Facility: MEDICAL CENTER | Age: 67
End: 2022-02-15

## 2022-02-15 ENCOUNTER — OFFICE VISIT (OUTPATIENT)
Dept: CARDIOLOGY | Facility: MEDICAL CENTER | Age: 67
End: 2022-02-15
Payer: COMMERCIAL

## 2022-02-15 VITALS
RESPIRATION RATE: 18 BRPM | OXYGEN SATURATION: 96 % | HEART RATE: 60 BPM | DIASTOLIC BLOOD PRESSURE: 90 MMHG | HEIGHT: 73 IN | SYSTOLIC BLOOD PRESSURE: 134 MMHG | WEIGHT: 279 LBS | BODY MASS INDEX: 36.98 KG/M2

## 2022-02-15 DIAGNOSIS — Z79.899 LONG TERM CURRENT USE OF ANTIARRHYTHMIC MEDICAL THERAPY: ICD-10-CM

## 2022-02-15 DIAGNOSIS — I48.0 PAF (PAROXYSMAL ATRIAL FIBRILLATION) (HCC): ICD-10-CM

## 2022-02-15 DIAGNOSIS — I10 ESSENTIAL HYPERTENSION, BENIGN: ICD-10-CM

## 2022-02-15 DIAGNOSIS — Z79.01 ANTICOAGULATED: ICD-10-CM

## 2022-02-15 DIAGNOSIS — E78.2 MIXED HYPERLIPIDEMIA: ICD-10-CM

## 2022-02-15 PROBLEM — Z79.82 ENCOUNTER FOR MONITORING OF CHRONIC ASPIRIN THERAPY: Status: RESOLVED | Noted: 2019-05-28 | Resolved: 2022-02-15

## 2022-02-15 PROBLEM — Z51.81 ENCOUNTER FOR MONITORING OF CHRONIC ASPIRIN THERAPY: Status: RESOLVED | Noted: 2019-05-28 | Resolved: 2022-02-15

## 2022-02-15 LAB — EKG IMPRESSION: NORMAL

## 2022-02-15 PROCEDURE — 99214 OFFICE O/P EST MOD 30 MIN: CPT | Performed by: INTERNAL MEDICINE

## 2022-02-15 PROCEDURE — 93000 ELECTROCARDIOGRAM COMPLETE: CPT | Performed by: INTERNAL MEDICINE

## 2022-02-15 RX ORDER — METOPROLOL SUCCINATE 25 MG/1
25 TABLET, EXTENDED RELEASE ORAL DAILY
Qty: 180 TABLET | Refills: 3 | Status: SHIPPED | OUTPATIENT
Start: 2022-02-15 | End: 2022-03-07 | Stop reason: SDUPTHER

## 2022-02-15 ASSESSMENT — ENCOUNTER SYMPTOMS
LOSS OF CONSCIOUSNESS: 0
MYALGIAS: 0
DIZZINESS: 0
PALPITATIONS: 0
SHORTNESS OF BREATH: 0
BRUISES/BLEEDS EASILY: 0
COUGH: 0

## 2022-02-15 NOTE — TELEPHONE ENCOUNTER
----- Message from Dalton Darden M.D. sent at 2/15/2022  9:38 AM PST -----  Regarding: Lipid panel  Would you send a message to the patient did get a cholesterol panel, I do not see 1 for quite some time and he is on simvastatinI put the order inThank you

## 2022-02-15 NOTE — TELEPHONE ENCOUNTER
Called patient mobile number and spoke to the patient, asked if he had a lipid panel in the last 2 years and he stated no. Advised I will mail him the lab slip and to complete when he can, ensure he is fasting. Answered all questions and concerns, appreciative of call.     Printed lab order and mailed to the patient.

## 2022-02-15 NOTE — PROGRESS NOTES
Chief Complaint   Patient presents with   • Atrial Fibrillation     F/V Dx: PAF (paroxysmal atrial fibrillation) (HCC)   • Hyperlipidemia   • Hypertension     F/V Dx: Essential hypertension, benign       Subjective     Bernard Bustos is a 66 y.o. male who presents today for follow-up evaluation of PAF, antiarrhythmic therapy, hypertension and hyperlipidemia.    Since 9/28/2021 appointment the patient has had no cardiac symptoms including chest pain or palpitations.  Had upper respiratory head congestion x1 month while his kids moved back in with he and his wife for 6 weeks while their house was being fixed, recovered 2 weeks ago.  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  He does note some increased fatigue with increased exertional activity such as walking up an incline.  Looking forward to doing more camping and hiking similar to what he did last summer.  Has seen pulmonary for LYDIA reevaluation final results still pending.  Eating problems.     Hospitalized [facility] 12/13/2016-12/17/2016.  Severe gastroenteritis with nausea and vomiting.  Recurrent atrial fibrillation.  Required electrical cardioversion 12/16/2016.    Past Medical History:   Diagnosis Date   • Atrial fibrillation (HCC) 12/29/2011   • Chickenpox    • Essential hypertension, benign 12/29/2011   • Long term (current) use of anticoagulants 12/29/2011   • Obesity 12/29/2011   • Other general symptoms(780.99)    • Pure hypercholesterolemia 12/29/2011   • Sleep apnea 12/29/2011   • Unspecified hypothyroidism 12/29/2011     Past Surgical History:   Procedure Laterality Date   • APPENDECTOMY      1994     Family History   Problem Relation Age of Onset   • Heart Disease Mother         ACUTE MI IN HER 60s.   • Heart Disease Father         ACUTE MI IN HIS 50s.     Social History     Socioeconomic History   • Marital status:      Spouse name: Not on file   • Number of children: Not on file   • Years of  education: Not on file   • Highest education level: Not on file   Occupational History   • Not on file   Tobacco Use   • Smoking status: Never Smoker   • Smokeless tobacco: Never Used   Vaping Use   • Vaping Use: Never used   Substance and Sexual Activity   • Alcohol use: No   • Drug use: Not Currently   • Sexual activity: Not on file   Other Topics Concern   • Not on file   Social History Narrative   • Not on file     Social Determinants of Health     Financial Resource Strain:    • Difficulty of Paying Living Expenses: Not on file   Food Insecurity:    • Worried About Running Out of Food in the Last Year: Not on file   • Ran Out of Food in the Last Year: Not on file   Transportation Needs:    • Lack of Transportation (Medical): Not on file   • Lack of Transportation (Non-Medical): Not on file   Physical Activity:    • Days of Exercise per Week: Not on file   • Minutes of Exercise per Session: Not on file   Stress:    • Feeling of Stress : Not on file   Social Connections:    • Frequency of Communication with Friends and Family: Not on file   • Frequency of Social Gatherings with Friends and Family: Not on file   • Attends Alevism Services: Not on file   • Active Member of Clubs or Organizations: Not on file   • Attends Club or Organization Meetings: Not on file   • Marital Status: Not on file   Intimate Partner Violence:    • Fear of Current or Ex-Partner: Not on file   • Emotionally Abused: Not on file   • Physically Abused: Not on file   • Sexually Abused: Not on file   Housing Stability:    • Unable to Pay for Housing in the Last Year: Not on file   • Number of Places Lived in the Last Year: Not on file   • Unstable Housing in the Last Year: Not on file     No Known Allergies  Outpatient Encounter Medications as of 2/15/2022   Medication Sig Dispense Refill   • metoprolol SR (TOPROL XL) 25 MG TABLET SR 24 HR Take 1 Tablet by mouth every day. 180 Tablet 3   • ELIQUIS 5 MG Tab TAKE 1 TABLET BY MOUTH TWICE DAILY  "180 Tablet 2   • sildenafil citrate (VIAGRA) 100 MG tablet Take 50 mg by mouth as needed for Erectile Dysfunction.     • ANDROGEL PUMP 20.25 MG/ACT (1.62%) Gel DARWIN 2 PUMPS TO EACH SHOULDER AND UPPER ARM QAM  0   • levothyroxine (SYNTHROID) 150 MCG Tab Take 150 mcg by mouth Every morning on an empty stomach.     • digoxin (LANOXIN) 250 MCG TABS Take 1 Tab by mouth every day. 90 Tab 0   • valsartan (DIOVAN) 160 MG TABS Take 160 mg by mouth 2 times a day.     • simvastatin (ZOCOR) 10 MG TABS Take 10 mg by mouth every evening.     • [DISCONTINUED] sotalol (BETAPACE) 120 MG tablet TAKE 1 TABLET TWICE A  Tablet 3   • [DISCONTINUED] ZOSTAVAX 83834 UNT/0.65ML Recon Susp ADM 0.65ML SC UTD (Patient not taking: Reported on 2/15/2022)  0     No facility-administered encounter medications on file as of 2/15/2022.     Review of Systems   Constitutional: Negative for malaise/fatigue.   Respiratory: Negative for cough and shortness of breath.    Cardiovascular: Negative for chest pain and palpitations.   Musculoskeletal: Negative for myalgias.   Neurological: Negative for dizziness and loss of consciousness.   Endo/Heme/Allergies: Does not bruise/bleed easily.   All other systems reviewed and are negative.             Objective     /90 (BP Location: Left arm, Patient Position: Sitting, BP Cuff Size: Adult)   Pulse 60   Resp 18   Ht 1.854 m (6' 1\")   Wt (!) 127 kg (279 lb)   SpO2 96%   BMI 36.81 kg/m²     Physical Exam  Vitals reviewed.   Constitutional:       General: He is not in acute distress.     Appearance: He is well-developed.   Eyes:      Conjunctiva/sclera: Conjunctivae normal.      Pupils: Pupils are equal, round, and reactive to light.   Neck:      Vascular: No JVD.   Cardiovascular:      Rate and Rhythm: Normal rate and regular rhythm.      Pulses:           Carotid pulses are 2+ on the right side and 2+ on the left side.     Heart sounds: Normal heart sounds. No murmur heard.  No friction rub. No " gallop.    Pulmonary:      Effort: Pulmonary effort is normal. No accessory muscle usage or respiratory distress.      Breath sounds: Normal breath sounds. No wheezing or rales.   Abdominal:      General: There is no distension.      Palpations: Abdomen is soft. There is no mass.      Tenderness: There is no abdominal tenderness.      Comments: Protuberant   Musculoskeletal:      Cervical back: Normal range of motion and neck supple.   Skin:     General: Skin is warm and dry.      Findings: No rash.      Nails: There is no clubbing.   Neurological:      Mental Status: He is alert and oriented to person, place, and time.   Psychiatric:         Behavior: Behavior normal.            ECHOCARDIOGRAM 12/14/2016  No prior study is available for comparison.   Normal left ventricular size and systolic function.  Mild concentric left ventricular hypertrophy.  Left ventricular ejection fraction is visually estimated to be 60%.  The right ventricle was normal in size and function.  Trace tricuspid regurgitation.  Estimated right ventricular systolic pressure  is 40 mmHg.  Normal pericardium without effusion.    ECHOCARDIOGRAM 12/20/2021  Normal left ventricular systolic function.  The left ventricular ejection fraction is visually estimated to be 55%.  Mild mitral regurgitation.  The right ventricle is normal in size and systolic function.  Rhythm is atrial fibrillation.    EKG 05/28/2019 normal sinus rhythm, rate 55.  QTc 402.  Reviewed by myself.    EKG 9/20/2021 atrial fibrillation, rate 60, personally reviewed, interpreted.    EKG 2/15/2022 atrial fibrillation, rate 80, PVC, personally reviewed, interpreted.      Assessment & Plan     1. PAF (paroxysmal atrial fibrillation) ([facility])  EKG   2. Long term current use of antiarrhythmic medical therapy     3. Essential hypertension, benign     4. Mixed hyperlipidemia  LIPID PANEL   5. Anticoagulated         Medical Decision Making: Today's Assessment/Status/Plan:   Assessment  1.   Atrial fibrillation, persistent  2.  Electrical cardioversion 12/16/2016.  3.  Antiarrhythmic therapy with sotalol.  4.  Hypertension.   5.  HLD.  6.  Hypothyroidism.  7.  Obesity.  8.  LYDIA, S/P uvulectomy.  9.  Testosterone deficiency     Recommendation Discussion  1.  Atrial fibrillation: EKG today shows atrial fibrillation with rate control on sotalol, recent echocardiogram reviewed, shows normal EF, again discussed management options rate control vs rhythm control with ablation however the patient again declines ablation procedure citing his brother's experience of unsuccessful atrial fibrillation ablation, remains asymptomatic, transition from sotalol to metoprolol ER 25 mg twice daily, continue digoxin and apixaban.  2.  Hypertension: Instructed patient continue to monitor, goal BP 130s/70s if remains greater instructed to contact the office for additional management.  3.  Dyslipidemia: On simvastatin, no recent lipid panel, will recheck.  4.  LYDIA: Evaluation still in progress.  5.  RTC 6 months, sooner if necessary.

## 2022-02-22 ENCOUNTER — HOME STUDY (OUTPATIENT)
Dept: SLEEP MEDICINE | Facility: MEDICAL CENTER | Age: 67
End: 2022-02-22
Attending: FAMILY MEDICINE
Payer: COMMERCIAL

## 2022-02-22 DIAGNOSIS — G47.33 OSA (OBSTRUCTIVE SLEEP APNEA): ICD-10-CM

## 2022-02-22 PROCEDURE — 95806 SLEEP STUDY UNATT&RESP EFFT: CPT | Performed by: FAMILY MEDICINE

## 2022-02-28 NOTE — PROCEDURES
DIAGNOSTIC HOME SLEEP TEST (HST) REPORT       PATIENT ID:  NAME:  Myke Bustos  MRN:               [id number]  YOB: 1955  DATE OF STUDY: 2/22/22      Impression:     This study shows evidence of:     1.Severe obstructive sleep apnea with  Respiratory Event Index (RUTHIE) of 74.9 per hour and worse in supine sleep with RUTHIE at 84.7. These findings are based on the recording time (flow evaluation time). It is not possible with this device to determine a traditional apnea+hypopnea index (AHI) for total sleep time since EEG channels are not available.     2.  O2 Sat. deepika was 82% and mean O2 sat was 91% and baseline O2 at 95 %. O2 sat was below 88% for 19% of the flow evaluation time. Oxygen Desaturation (>=3%) Index was elevated at 77.1/hr. AVG HR was 70 BPM.      TECHNICAL DESCRIPTION:  [email or web address] Device used was a type-III home study device. Home sleep study recording included: Airflow recording by nasal pressure transducer; Respiratory Effort by abdominal Respiratory Bands; O2 by finger oximetry. A position sensor and a snore channel was also used.    Scoring Criteria: A modification of the the AASM Manual for the Scoring of Sleep and Associated Events, 2012, was used.   Obstructive apnea was scored by cessation of airflow for at least 10 seconds with continuing respiratory effort.  Central apnea was scored by cessation of airflow for at least 10 seconds with no effort.  Hypopnea was scored by a 30% or more reduction in airflow for at least 10 seconds accompanied by an arterial oxygen desaturation of 3% or more.  (For Medicare patients, hypopneas were scored by a 30% or more reduction in airflow for at least 10 seconds accompanied by an arterial oxygen saturation of 4% or more, as required by their insurance, CMS.        General sleep summary: . Total recording time is 6 hours and 28 minutes and total flow evaluation time is 6 hours and 17 minutes. The patient spent 0 hours and 35 minutes in the  supine position.    Respiratory events:    Apneas: 185 (Obstructive apnea index 13.4/hr, Central apnea index 16.1 /hr, mixed 0 /hour)  Hypopneas: 285    Recommendations:    1. CPAP/BiPAP titration study due to elevated central apnea index.   2.   In general patients with sleep apnea are advised to avoid alcohol and sedatives and to not operate a motor vehicle while drowsy. In some cases alternative treatment options may prove effective in resolving sleep apnea in these options include upper airway surgery, the use of a dental orthotic or weight loss and positional therapy. Clinical correlation is required.         Lm Rodgers MD

## 2022-03-01 ENCOUNTER — PATIENT MESSAGE (OUTPATIENT)
Dept: SLEEP MEDICINE | Facility: MEDICAL CENTER | Age: 67
End: 2022-03-01
Payer: COMMERCIAL

## 2022-03-01 DIAGNOSIS — G47.33 OSA (OBSTRUCTIVE SLEEP APNEA): ICD-10-CM

## 2022-03-01 NOTE — TELEPHONE ENCOUNTER
From: Myke Bustos  To: Physician Lm Rodgers  Sent: 3/1/2022 9:44 AM PST  Subject: Sleep study results    Should I set up an appointment with you to go over the results? Thanks, Bernard

## 2022-03-07 DIAGNOSIS — I10 ESSENTIAL HYPERTENSION, BENIGN: ICD-10-CM

## 2022-03-10 RX ORDER — METOPROLOL SUCCINATE 25 MG/1
25 TABLET, EXTENDED RELEASE ORAL 2 TIMES DAILY
Qty: 180 TABLET | Refills: 3 | Status: SHIPPED | OUTPATIENT
Start: 2022-03-10 | End: 2022-12-08 | Stop reason: SDUPTHER

## 2022-05-11 DIAGNOSIS — I48.0 PAF (PAROXYSMAL ATRIAL FIBRILLATION) (HCC): ICD-10-CM

## 2022-06-14 ENCOUNTER — OFFICE VISIT (OUTPATIENT)
Dept: SLEEP MEDICINE | Facility: MEDICAL CENTER | Age: 67
End: 2022-06-14
Payer: MEDICARE

## 2022-06-14 VITALS
DIASTOLIC BLOOD PRESSURE: 74 MMHG | OXYGEN SATURATION: 95 % | HEIGHT: 73 IN | WEIGHT: 276 LBS | SYSTOLIC BLOOD PRESSURE: 134 MMHG | HEART RATE: 80 BPM | BODY MASS INDEX: 36.58 KG/M2

## 2022-06-14 DIAGNOSIS — G47.33 OSA (OBSTRUCTIVE SLEEP APNEA): Primary | ICD-10-CM

## 2022-06-14 PROCEDURE — 99213 OFFICE O/P EST LOW 20 MIN: CPT | Performed by: STUDENT IN AN ORGANIZED HEALTH CARE EDUCATION/TRAINING PROGRAM

## 2022-06-14 NOTE — PROGRESS NOTES
[facility] Follow-up Visit    CC: Follow-up to discuss first compliance after receiving new CPAP machine      HPI:  Myke Bustos is a 67 y.o.male  with atrial fibrillation, hyperlipidemia, hypertension and severe obstructive sleep apnea on CPAP.  Presents today to discuss LYDIA management.    He is using his CPAP nightly.  He feels it is helping his sleep however he does continue to have issues with awakenings and trouble getting back to sleep.  He does feel more rested at times.  He has felt that the ramp is not enough pressure where he is getting enough air.    DME provider: [facility]   Device: Airsense 11  Mask: Nasal Pillows   Aerophagia: No   Snoring: No   Dry mouth: No   Leak: No   Skin irritation: No   Chin strap: Yes    Sleep History  Diagnosed with obstructive sleep apnea 2014 AHI of 61  Underwent UPPP 2015, HST following surgery 6/9/2015 showed mild sleep apnea with AHI of 6.4.   2/22/2022 HST showed severe obstructive sleep apnea overall AHI 75, minimum oxygen saturation 82%, spent 19% of flow evaluation less than 88%.  Study did show elevated central index at 16.1, Cheyne-Jordan respirations for 5 hours.    Patient Active Problem List    Diagnosis Date Noted   • Anticoagulated 02/15/2022   • Encounter for long-term current use of high risk medication 09/28/2021   • Long term current use of antiarrhythmic medical therapy 03/16/2017   • High risk medication use 12/30/2016   • HLD (hyperlipidemia) 12/14/2016   • Other general symptoms    • PAF (paroxysmal atrial fibrillation) (HCC) 12/30/2011   • Essential hypertension, benign 12/29/2011   • Hypothyroidism 12/29/2011   • Obesity 12/29/2011   • Sleep apnea        Past Medical History:   Diagnosis Date   • Atrial fibrillation (HCC) 12/29/2011   • Chickenpox    • Essential hypertension, benign 12/29/2011   • Long term (current) use of anticoagulants 12/29/2011   • Obesity 12/29/2011   • Other general symptoms(780.99)    • Pure hypercholesterolemia  12/29/2011   • Sleep apnea 12/29/2011   • Unspecified hypothyroidism 12/29/2011        Past Surgical History:   Procedure Laterality Date   • APPENDECTOMY      1994       Family History   Problem Relation Age of Onset   • Heart Disease Mother         ACUTE MI IN HER 60s.   • Heart Disease Father         ACUTE MI IN HIS 50s.       Social History     Socioeconomic History   • Marital status:      Spouse name: Not on file   • Number of children: Not on file   • Years of education: Not on file   • Highest education level: Not on file   Occupational History   • Not on file   Tobacco Use   • Smoking status: Never Smoker   • Smokeless tobacco: Never Used   Vaping Use   • Vaping Use: Never used   Substance and Sexual Activity   • Alcohol use: No   • Drug use: Not Currently   • Sexual activity: Not on file   Other Topics Concern   • Not on file   Social History Narrative   • Not on file     Social Determinants of Health     Financial Resource Strain: Not on file   Food Insecurity: Not on file   Transportation Needs: Not on file   Physical Activity: Not on file   Stress: Not on file   Social Connections: Not on file   Intimate Partner Violence: Not on file   Housing Stability: Not on file       Current Outpatient Medications   Medication Sig Dispense Refill   • metoprolol SR (TOPROL XL) 25 MG TABLET SR 24 HR Take 1 Tablet by mouth 2 times a day. 180 Tablet 3   • ELIQUIS 5 MG Tab TAKE 1 TABLET BY MOUTH TWICE DAILY 180 Tablet 2   • sildenafil citrate (VIAGRA) 100 MG tablet Take 50 mg by mouth as needed for Erectile Dysfunction.     • ANDROGEL PUMP 20.25 MG/ACT (1.62%) Gel DARWIN 2 PUMPS TO EACH SHOULDER AND UPPER ARM QAM  0   • levothyroxine (SYNTHROID) 150 MCG Tab Take 150 mcg by mouth Every morning on an empty stomach.     • digoxin (LANOXIN) 250 MCG TABS Take 1 Tab by mouth every day. 90 Tab 0   • valsartan (DIOVAN) 160 MG Tab Take 160 mg by mouth 2 times a day.     • simvastatin (ZOCOR) 10 MG TABS Take 10 mg by mouth  "every evening.       No current facility-administered medications for this visit.        ALLERGIES: Patient has no known allergies.    ROS  Constitutional: Denies fevers, Denies weight changes  Ears/Nose/Throat/Mouth: Denies nasal congestion or sore throat   Cardiovascular: Denies chest pain  Respiratory: Denies shortness of breath, Denies cough  Gastrointestinal/Hepatic: Denies nausea, vomiting  Sleep: see HPI      PHYSICAL EXAM  /74 (BP Location: Left arm, Patient Position: Sitting, BP Cuff Size: Large adult)   Pulse 80   Ht 1.854 m (6' 1\")   Wt (!) 125 kg (276 lb)   SpO2 95%   BMI 36.41 kg/m²   Appearance: Well-nourished, well-developed, no acute distress  Eyes:  No scleral icterus , EOMI  ENMT: masked  Musculoskeletal:  Grossly normal; gait and station normal; digits and nails normal  Skin:  No rashes, petechiae, cyanosis  Neurologic: without focal signs; oriented to person, time, place, and purpose; judgement intact      Medical Decision Making   Assessment and Plan  Myke Bustos is a 67 y.o.male  with atrial fibrillation, hyperlipidemia, hypertension and severe obstructive sleep apnea on CPAP.  Presents today to discuss LYDIA management.    Obstructive sleep apnea  Compliance data reviewed showing 100% usage > 4hours in last 30  days. Average AHI 17.7 events/hour. 90-95% pressure 12.1 CWP. Pt continues to use and benefit from machine.      Machine is reporting central index of 12.2, and Cheyne-Jordan respirations of 1 hour 25 minutes per night.  Per data central events and overall respiratory events appear to have decreased over the past 15 days.  Current settings auto CPAP 5-15    Discussed central sleep apnea and obstructive sleep apnea.  Advised may benefit from a titration study in the future.  With improvement of his respiratory events over the last 15 days we will continue to proceed with CPAP.  We will increase minimum pressure to 8 cm water as well as ramp to 8 cmH2O.  We will lower max " pressure to 13    PLAN:   -Order placed for mask and supplies   -Pressure changed auto CPAP 8-13  -Advised to reach out via MyChart with questions     Has been advised to continue the current  CPAP, clean equipment frequently, and get new mask and supplies as allowed by insurance and DME. Recommend an earlier appointment, if significant treatment barriers develop.    The risks of untreated sleep apnea were discussed with the patient at length. Patients with LYDIA are at increased risk of cardiovascular disease including coronary artery disease, systemic arterial hypertension, pulmonary arterial hypertension, cardiac arrythmias, and stroke. The patient was advised to avoid driving a motor vehicle when drowsy.    Positive airway pressure will favorably impact many of the adverse conditions and effects provoked by LYDIA.    Have advised the patient to follow up with the appropriate healthcare practitioners for all other medical problems and issues.    Return in about 2 months (around 8/14/2022).      Please note portions of this record was created using voice recognition software. I have made every reasonable attempt to correct obvious errors, but I expect that there are errors of grammar and possibly content I did not discover before finalizing the note.

## 2022-08-15 ENCOUNTER — APPOINTMENT (OUTPATIENT)
Dept: SLEEP MEDICINE | Facility: MEDICAL CENTER | Age: 67
End: 2022-08-15
Payer: MEDICARE

## 2022-09-06 ENCOUNTER — TELEPHONE (OUTPATIENT)
Dept: CARDIOLOGY | Facility: MEDICAL CENTER | Age: 67
End: 2022-09-06
Payer: MEDICARE

## 2022-09-06 NOTE — TELEPHONE ENCOUNTER
GUILLERMO    Caller: Myke Bustos     Medication Name and Dosage: ELIQUIS 5 MG Tab [[id number]      Did patient contact pharmacy (If no, please call pharmacy first): yes    Medication amount left: 0    Preferred Pharmacy: [facility] in [place]    Other questions (Topic): Pt is waiting on his meds from [facility] and needs a few to tie him over. Asked if we could call a few in or if we had some samples he can come and get.- Please advise    Callback Number (Will only call for issues): [phone] (home)      Thank You  Chelita JACOBSEN

## 2022-11-02 ENCOUNTER — PATIENT MESSAGE (OUTPATIENT)
Dept: HEALTH INFORMATION MANAGEMENT | Facility: OTHER | Age: 67
End: 2022-11-02

## 2022-12-08 DIAGNOSIS — I10 ESSENTIAL HYPERTENSION, BENIGN: ICD-10-CM

## 2022-12-08 NOTE — TELEPHONE ENCOUNTER
Is the patient due for a refill? No    Was the patient seen the past year? Yes    Date of last office visit: 2/15/2022    Does the patient have an upcoming appointment?  No     Provider to refill:SW    Does the patients insurance require a 100 day supply?  No

## 2022-12-14 RX ORDER — METOPROLOL SUCCINATE 25 MG/1
25 TABLET, EXTENDED RELEASE ORAL 2 TIMES DAILY
Qty: 180 TABLET | Refills: 0 | Status: SHIPPED | OUTPATIENT
Start: 2022-12-14 | End: 2023-01-20 | Stop reason: SDUPTHER

## 2023-01-17 ENCOUNTER — TELEPHONE (OUTPATIENT)
Dept: CARDIOLOGY | Facility: MEDICAL CENTER | Age: 68
End: 2023-01-17
Payer: MEDICARE

## 2023-01-17 NOTE — TELEPHONE ENCOUNTER
GUILLERMO    Caller: Bernard    Topic/issue: Pt called and stated due to a change in his insurance that his pharmacy is now changing over to:    [facility] Pres[facility] plan through [facility].  . [phone]    Callback Number: [phone] (pt)

## 2023-01-20 DIAGNOSIS — I48.0 PAF (PAROXYSMAL ATRIAL FIBRILLATION) (HCC): ICD-10-CM

## 2023-01-20 DIAGNOSIS — I10 ESSENTIAL HYPERTENSION, BENIGN: ICD-10-CM

## 2023-01-20 NOTE — TELEPHONE ENCOUNTER
Is the patient due for a refill? Yes for Metoprolol.     Was the patient seen the past year? Yes    Date of last office visit: 2/15/22    Does the patient have an upcoming appointment?  No       Provider to refill:SW    Does the patients insurance require a 100 day supply?  No

## 2023-01-24 RX ORDER — METOPROLOL SUCCINATE 25 MG/1
25 TABLET, EXTENDED RELEASE ORAL 2 TIMES DAILY
Qty: 180 TABLET | Refills: 0 | Status: SHIPPED | OUTPATIENT
Start: 2023-01-24 | End: 2023-02-09 | Stop reason: SDUPTHER

## 2023-01-24 NOTE — TELEPHONE ENCOUNTER
Creatinine and CBC ordered per protocol and mailed to patient.    RX's refilled for courtesy 90 days.   Routed to schedulers.

## 2023-02-04 ENCOUNTER — PATIENT MESSAGE (OUTPATIENT)
Dept: CARDIOLOGY | Facility: MEDICAL CENTER | Age: 68
End: 2023-02-04
Payer: MEDICARE

## 2023-02-04 DIAGNOSIS — I48.0 PAF (PAROXYSMAL ATRIAL FIBRILLATION) (HCC): ICD-10-CM

## 2023-02-04 DIAGNOSIS — E78.2 MIXED HYPERLIPIDEMIA: ICD-10-CM

## 2023-02-04 DIAGNOSIS — I10 ESSENTIAL HYPERTENSION, BENIGN: ICD-10-CM

## 2023-02-04 DIAGNOSIS — E03.9 HYPOTHYROIDISM, UNSPECIFIED TYPE: ICD-10-CM

## 2023-02-08 ENCOUNTER — PATIENT MESSAGE (OUTPATIENT)
Dept: CARDIOLOGY | Facility: MEDICAL CENTER | Age: 68
End: 2023-02-08
Payer: MEDICARE

## 2023-02-08 DIAGNOSIS — I10 ESSENTIAL HYPERTENSION, BENIGN: ICD-10-CM

## 2023-02-08 DIAGNOSIS — I48.0 PAF (PAROXYSMAL ATRIAL FIBRILLATION) (HCC): ICD-10-CM

## 2023-02-09 ENCOUNTER — PATIENT MESSAGE (OUTPATIENT)
Dept: CARDIOLOGY | Facility: MEDICAL CENTER | Age: 68
End: 2023-02-09
Payer: MEDICARE

## 2023-02-09 DIAGNOSIS — I10 ESSENTIAL HYPERTENSION, BENIGN: ICD-10-CM

## 2023-02-09 DIAGNOSIS — I48.0 PAF (PAROXYSMAL ATRIAL FIBRILLATION) (HCC): ICD-10-CM

## 2023-02-09 RX ORDER — METOPROLOL SUCCINATE 25 MG/1
25 TABLET, EXTENDED RELEASE ORAL 2 TIMES DAILY
Qty: 180 TABLET | Refills: 0 | Status: SHIPPED | OUTPATIENT
Start: 2023-02-09 | End: 2023-05-03

## 2023-02-09 RX ORDER — DIGOXIN 250 MCG
250 TABLET ORAL DAILY
Qty: 90 TABLET | Refills: 0 | Status: SHIPPED | OUTPATIENT
Start: 2023-02-09 | End: 2023-05-03

## 2023-02-28 ENCOUNTER — PATIENT MESSAGE (OUTPATIENT)
Dept: CARDIOLOGY | Facility: MEDICAL CENTER | Age: 68
End: 2023-02-28
Payer: MEDICARE

## 2023-04-05 ENCOUNTER — OFFICE VISIT (OUTPATIENT)
Dept: CARDIOLOGY | Facility: MEDICAL CENTER | Age: 68
End: 2023-04-05
Payer: MEDICARE

## 2023-04-05 VITALS
OXYGEN SATURATION: 94 % | HEIGHT: 73 IN | SYSTOLIC BLOOD PRESSURE: 116 MMHG | BODY MASS INDEX: 34.59 KG/M2 | HEART RATE: 87 BPM | RESPIRATION RATE: 16 BRPM | WEIGHT: 261 LBS | DIASTOLIC BLOOD PRESSURE: 76 MMHG

## 2023-04-05 DIAGNOSIS — I48.19 ATRIAL FIBRILLATION, PERSISTENT (HCC): ICD-10-CM

## 2023-04-05 DIAGNOSIS — I10 ESSENTIAL HYPERTENSION, BENIGN: ICD-10-CM

## 2023-04-05 DIAGNOSIS — I48.0 PAF (PAROXYSMAL ATRIAL FIBRILLATION) (HCC): ICD-10-CM

## 2023-04-05 DIAGNOSIS — D68.69 HYPERCOAGULABLE STATE DUE TO PAROXYSMAL ATRIAL FIBRILLATION (HCC): ICD-10-CM

## 2023-04-05 DIAGNOSIS — I48.0 HYPERCOAGULABLE STATE DUE TO PAROXYSMAL ATRIAL FIBRILLATION (HCC): ICD-10-CM

## 2023-04-05 DIAGNOSIS — Z79.01 ANTICOAGULATED: ICD-10-CM

## 2023-04-05 PROBLEM — I48.91 HYPERCOAGULABILITY DUE TO ATRIAL FIBRILLATION (HCC): Status: ACTIVE | Noted: 2023-04-05

## 2023-04-05 LAB — EKG IMPRESSION: NORMAL

## 2023-04-05 PROCEDURE — 93000 ELECTROCARDIOGRAM COMPLETE: CPT | Performed by: INTERNAL MEDICINE

## 2023-04-05 PROCEDURE — 99214 OFFICE O/P EST MOD 30 MIN: CPT | Mod: 25 | Performed by: INTERNAL MEDICINE

## 2023-04-05 ASSESSMENT — ENCOUNTER SYMPTOMS
COUGH: 0
SHORTNESS OF BREATH: 1
DIZZINESS: 0
PALPITATIONS: 0
BRUISES/BLEEDS EASILY: 0
MYALGIAS: 0
LOSS OF CONSCIOUSNESS: 0

## 2023-04-05 NOTE — PROGRESS NOTES
Chief Complaint   Patient presents with    Atrial Fibrillation     F/V Dx: PAF (paroxysmal atrial fibrillation) (HCC)    Hypertension    Hyperlipidemia       Subjective     Bernard Bustos is a 67 y.o. male who presents today for follow-up cardiac care.    The patient has persistent atrial fibrillation since 2/2022 on prior AAD with sotalol discontinued 2/2022, hypertension, dyslipidemia, hypothyroidism, obesity, sleep apnea    Here today with his wife    Since 2/15/2022 appointment the patient was hospitalized at [facility] 2/23/2023 -3/6/2023 for streptococcal, group G bacteremia, sepsis, undergoing I/D left forearm elbow and left leg abscess now with a PICC line followed by [facility].  Per the patient during his hospitalization he reportedly had some bradycardia but did not require temporary pacemaker.  Did not undergo MIESHA.  Symptomatically improved.  He has lost weight, 18 pounds related to his recent illness.  At his last appointment in 2/2022 he was found to be in atrial fibrillation on sotalol.  He declined referral for ablation procedure because of his brothers bad experience having undergone 2 ablation procedures nor did he want repeat DCCV.  Sotalol was discontinued and he was started on metoprolol and continued on digoxin.  He did reasonably well but now he reports some moderate exercise intolerance with dyspnea and is willing to reconsider Afib ablation procedure at the encouragement of his wife.  No angina and or palpitations.  He has been reevaluated and restarted on CPAP therapy for his LYDIA by [facility]    Hospitalized [facility] 12/13/2016-12/17/2016.  Severe gastroenteritis with nausea and vomiting.  Recurrent atrial fibrillation.  Required electrical cardioversion 12/16/2016.    The patient was initially diagnosed with atrial fibrillation in 12/2008.  In 4/2009 he underwent DCCV which initially was unsuccessful resulting in hospitalization and titration of  AAD with sotalol up to 160 mg bid with subsequent successful DCCV and reestablishing sinus rhythm undergoing DCCV with AAD with sotalol    Past Medical History:   Diagnosis Date    Atrial fibrillation (HCC) 12/29/2011    Chickenpox     Essential hypertension, benign 12/29/2011    Long term (current) use of anticoagulants 12/29/2011    Obesity 12/29/2011    Other general symptoms(780.99)     Pure hypercholesterolemia 12/29/2011    Sleep apnea 12/29/2011    Unspecified hypothyroidism 12/29/2011     Past Surgical History:   Procedure Laterality Date    APPENDECTOMY      1994     Family History   Problem Relation Age of Onset    Heart Disease Mother         ACUTE MI IN HER 60s.    Heart Disease Father         ACUTE MI IN HIS 50s.     Social History     Socioeconomic History    Marital status:      Spouse name: Not on file    Number of children: Not on file    Years of education: Not on file    Highest education level: Not on file   Occupational History    Not on file   Tobacco Use    Smoking status: Never    Smokeless tobacco: Never   Vaping Use    Vaping Use: Never used   Substance and Sexual Activity    Alcohol use: No    Drug use: Not Currently    Sexual activity: Not on file   Other Topics Concern    Not on file   Social History Narrative    Not on file     Social Determinants of Health     Financial Resource Strain: Not on file   Food Insecurity: Not on file   Transportation Needs: Not on file   Physical Activity: Not on file   Stress: Not on file   Social Connections: Not on file   Intimate Partner Violence: Not on file   Housing Stability: Not on file     No Known Allergies  Outpatient Encounter Medications as of 4/5/2023   Medication Sig Dispense Refill    apixaban (ELIQUIS) 5mg Tab TAKE 1 TABLET BY MOUTH TWICE DAILY 180 Tablet 0    metoprolol SR (TOPROL XL) 25 MG TABLET SR 24 HR Take 1 Tablet by mouth 2 times a day. 180 Tablet 0    digoxin (LANOXIN) 250 MCG Tab Take 1 tablet by mouth every day. 90 Tablet  "0    levothyroxine (SYNTHROID) 150 MCG Tab Take 1 Tablet by mouth every morning on an empty stomach. 90 Tablet 0    simvastatin (ZOCOR) 10 MG Tab Take 1 Tablet by mouth every evening. 90 Tablet 0    valsartan (DIOVAN) 160 MG Tab Take 1 Tablet by mouth 2 times a day. 180 Tablet 0    sildenafil citrate (VIAGRA) 100 MG tablet Take 50 mg by mouth as needed for Erectile Dysfunction.      ANDROGEL PUMP 20.25 MG/ACT (1.62%) Gel DARWIN 2 PUMPS TO EACH SHOULDER AND UPPER ARM QAM  0     No facility-administered encounter medications on file as of 4/5/2023.     Review of Systems   Constitutional:  Negative for malaise/fatigue.   Respiratory:  Positive for shortness of breath. Negative for cough.    Cardiovascular:  Negative for chest pain and palpitations.   Musculoskeletal:  Negative for myalgias.   Neurological:  Negative for dizziness and loss of consciousness.   Endo/Heme/Allergies:  Does not bruise/bleed easily.            Objective     /76 (BP Location: Left arm, Patient Position: Sitting, BP Cuff Size: Adult)   Pulse 87   Resp 16   Ht 1.854 m (6' 1\")   Wt 118 kg (261 lb)   SpO2 94%   BMI 34.43 kg/m²   BP Readings from Last 5 Encounters:   04/05/23 116/76   06/14/22 134/74   02/15/22 134/90   11/17/21 128/82   09/28/21 138/82      Pulse Readings from Last 5 Encounters:   04/05/23 87   06/14/22 80   02/15/22 60   11/17/21 64   09/28/21 68      Wt Readings from Last 5 Encounters:   04/05/23 118 kg (261 lb)   06/14/22 (!) 125 kg (276 lb)   02/15/22 (!) 127 kg (279 lb)   11/17/21 (!) 127 kg (279 lb)   09/28/21 (!) 127 kg (280 lb 9.6 oz)       Physical Exam  Vitals reviewed.   Constitutional:       General: He is not in acute distress.     Appearance: He is well-developed.   Eyes:      Conjunctiva/sclera: Conjunctivae normal.      Pupils: Pupils are equal, round, and reactive to light.   Neck:      Vascular: No JVD.   Cardiovascular:      Rate and Rhythm: Normal rate. Rhythm irregular.      Pulses:           Carotid " pulses are 1+ on the right side and 1+ on the left side.       Radial pulses are 2+ on the right side and 2+ on the left side.      Heart sounds: Normal heart sounds. No murmur heard.    No friction rub. No gallop.   Pulmonary:      Effort: Pulmonary effort is normal. No accessory muscle usage or respiratory distress.      Breath sounds: Normal breath sounds. No wheezing or rales.   Abdominal:      Comments: Protuberant   Musculoskeletal:      Cervical back: Normal range of motion and neck supple.      Right lower leg: No edema.      Left lower leg: No edema.      Comments: PICC line right antecubital area   Skin:     General: Skin is warm and dry.      Findings: No rash.      Nails: There is no clubbing.   Neurological:      Mental Status: He is alert and oriented to person, place, and time.   Psychiatric:         Behavior: Behavior normal.          ECHOCARDIOGRAM 12/14/2016  No prior study is available for comparison.   Normal left ventricular size and systolic function.  Mild concentric left ventricular hypertrophy.  Left ventricular ejection fraction is visually estimated to be 60%.  The right ventricle was normal in size and function.  Trace tricuspid regurgitation.  Estimated right ventricular systolic pressure  is 40 mmHg.  Normal pericardium without effusion.    ECHOCARDIOGRAM 12/20/2021  Normal left ventricular systolic function.  The left ventricular ejection fraction is visually estimated to be 55%.  Mild mitral regurgitation.  The right ventricle is normal in size and systolic function.  Rhythm is atrial fibrillation.    EKG 05/28/2019 normal sinus rhythm, rate 55.  QTc 402.  Reviewed by myself.    EKG 4/5/2023 atrial fibrillation, rate 93, personally interpreted    Assessment & Plan     1. Atrial fibrillation, persistent (HCC)  EKG    REFERRAL TO CARDIOLOGY    DIGOXIN      2. PAF (paroxysmal atrial fibrillation) (HCC)        3. Hypercoagulable state due to paroxysmal atrial fibrillation (HCC)        4.  Anticoagulated        5. Essential hypertension, benign            Medical Decision Making: Today's Assessment/Status/Plan:   Assessment  Atrial fibrillation, persistent  NOAC with apixaban  DCC 2009, 2016  Previous AAD with sotalol, discontinued 2/2022.  Hypertension.   HLD.  Hypothyroidism.  Obesity.  LYDIA, S/P uvulectomy, on CPAP.  Testosterone deficiency  Streptococcal bacteremia, left elbow, left leg abscess I&D 2/2023 [facility]     Recommendation Discussion   Atrial fibrillation, persistent, symptomatic, after further discussion wishes to now explore Afib ablation, also discussed that successful ablation may be more challenging since he has remained in Afib for an entire year and may take more than 1 ablation attempt to restore sinus rhythm, heart rate now may not be adequately controlled, EKG today showed Afib with rate of 93, will increase metoprolol ER from 25 mg bid to 50 mg bid, continue digoxin, will get digoxin level, continue apixaban.  Anticoagulation with apixaban, creatinine 0.9, continue 5 mg bid  Hypertension, controlled, at goal, continue valsartan  Dyslipidemia, on simvastatin, per PCP  LYDIA, on CPAP followed by [facility]  Obtain medical records from [facility], in particular echocardiogram  RTC 3 months, sooner if necessary.

## 2023-04-29 DIAGNOSIS — I10 ESSENTIAL HYPERTENSION, BENIGN: ICD-10-CM

## 2023-04-29 DIAGNOSIS — I48.0 PAF (PAROXYSMAL ATRIAL FIBRILLATION) (HCC): ICD-10-CM

## 2023-05-01 NOTE — TELEPHONE ENCOUNTER
Is the patient due for a refill? Yes    Was the patient seen the past year? Yes    Date of last office visit: 4/5/2023    Does the patient have an upcoming appointment?  Yes   If yes, When? 5/23/2023    Provider to refill:[email or web address]    Does the patients insurance require a 100 day supply?  No

## 2023-05-03 RX ORDER — METOPROLOL SUCCINATE 25 MG/1
TABLET, EXTENDED RELEASE ORAL
Qty: 180 TABLET | Refills: 3 | Status: SHIPPED | OUTPATIENT
Start: 2023-05-03 | End: 2023-08-31 | Stop reason: SDUPTHER

## 2023-05-03 RX ORDER — DIGOXIN 250 MCG
TABLET ORAL
Qty: 90 TABLET | Refills: 3 | Status: SHIPPED | OUTPATIENT
Start: 2023-05-03 | End: 2023-08-31

## 2023-05-06 ENCOUNTER — PATIENT MESSAGE (OUTPATIENT)
Dept: CARDIOLOGY | Facility: MEDICAL CENTER | Age: 68
End: 2023-05-06
Payer: MEDICARE

## 2023-05-23 ENCOUNTER — OFFICE VISIT (OUTPATIENT)
Dept: CARDIOLOGY | Facility: MEDICAL CENTER | Age: 68
End: 2023-05-23
Attending: INTERNAL MEDICINE
Payer: MEDICARE

## 2023-05-23 ENCOUNTER — TELEPHONE (OUTPATIENT)
Dept: CARDIOLOGY | Facility: MEDICAL CENTER | Age: 68
End: 2023-05-23

## 2023-05-23 VITALS
HEIGHT: 73 IN | HEART RATE: 63 BPM | SYSTOLIC BLOOD PRESSURE: 118 MMHG | OXYGEN SATURATION: 97 % | WEIGHT: 261 LBS | DIASTOLIC BLOOD PRESSURE: 80 MMHG | RESPIRATION RATE: 14 BRPM | BODY MASS INDEX: 34.59 KG/M2

## 2023-05-23 DIAGNOSIS — I48.19 ATRIAL FIBRILLATION, PERSISTENT (HCC): ICD-10-CM

## 2023-05-23 PROCEDURE — 3074F SYST BP LT 130 MM HG: CPT | Performed by: INTERNAL MEDICINE

## 2023-05-23 PROCEDURE — 93010 ELECTROCARDIOGRAM REPORT: CPT | Performed by: INTERNAL MEDICINE

## 2023-05-23 PROCEDURE — 93005 ELECTROCARDIOGRAM TRACING: CPT | Performed by: INTERNAL MEDICINE

## 2023-05-23 PROCEDURE — 3079F DIAST BP 80-89 MM HG: CPT | Performed by: INTERNAL MEDICINE

## 2023-05-23 PROCEDURE — 99213 OFFICE O/P EST LOW 20 MIN: CPT | Performed by: INTERNAL MEDICINE

## 2023-05-23 PROCEDURE — 99205 OFFICE O/P NEW HI 60 MIN: CPT | Mod: 25 | Performed by: INTERNAL MEDICINE

## 2023-05-23 NOTE — PROGRESS NOTES
Arrhythmia Clinic Note (New patient)     DOS: 5/23/2023    Referring physician: Dr Darden    Chief complaint/Reason for consult: Afib    HPI: 68-year-old man with a history of atrial fibrillation.  He has history of cardioversion.  He had been managed on sotalol until he had recurrent persistent atrial fibrillation last year and was taken off.  Since then he endorses fatigue, shortness of breath, no chest pain or syncope.  No palpitations.  His brother has had atrial fibrillation and had difficulty with medical management.  He did have an ablation x2.  Patient is now interested in having ablation.    ROS (+ highlighted in bold):  Constitutional: Fevers/chills/fatigue/weightloss  HEENT: Blurry vision/eye pain/sore throat/hearing loss  Respiratory: Shortness of breath/cough  Cardiovascular: Chest pain/palpitations/edema/orthopnea/syncope  GI: Nausea/vomitting/diarrhea  MSK: Arthralgias/myagias/muscle weakness  Skin: Rash/sores  Neurological: Numbness/tremors/vertigo  Endocrine: Excessive thirst/polyuria/cold intolerance/heat intolerance  Psych: Depression/anxiety    Past Medical History:   Diagnosis Date    Atrial fibrillation (HCC) 12/29/2011    Chickenpox     Essential hypertension, benign 12/29/2011    Long term (current) use of anticoagulants 12/29/2011    Obesity 12/29/2011    Other general symptoms(780.99)     Pure hypercholesterolemia 12/29/2011    Sleep apnea 12/29/2011    Unspecified hypothyroidism 12/29/2011       Past Surgical History:   Procedure Laterality Date    APPENDECTOMY      1994       Social History     Socioeconomic History    Marital status:      Spouse name: Not on file    Number of children: Not on file    Years of education: Not on file    Highest education level: Not on file   Occupational History    Not on file   Tobacco Use    Smoking status: Never    Smokeless tobacco: Never   Vaping Use    Vaping Use: Never used   Substance and Sexual Activity    Alcohol use: No    Drug use:  Not Currently    Sexual activity: Not on file   Other Topics Concern    Not on file   Social History Narrative    Not on file     Social Determinants of Health     Financial Resource Strain: Not on file   Food Insecurity: Not on file   Transportation Needs: Not on file   Physical Activity: Not on file   Stress: Not on file   Social Connections: Not on file   Intimate Partner Violence: Not on file   Housing Stability: Not on file       Family History   Problem Relation Age of Onset    Heart Disease Mother         ACUTE MI IN HER 60s.    Heart Disease Father         ACUTE MI IN HIS 50s.       No Known Allergies    Current Outpatient Medications   Medication Sig Dispense Refill    levothyroxine (SYNTHROID) 150 MCG Tab TAKE 1 TABLET EVERY MORNINGON AN EMPTY STOMACH 90 Tablet 0    levothyroxine (SYNTHROID) 150 MCG Tab Take 1 Tablet by mouth every morning on an empty stomach. 90 Tablet 0    apixaban (ELIQUIS) 5mg Tab TAKE 1 TABLET BY MOUTH TWICE DAILY 180 Tablet 0    digoxin (LANOXIN) 250 MCG Tab TAKE 1 TABLET DAILY 90 Tablet 3    metoprolol SR (TOPROL XL) 25 MG TABLET SR 24 HR TAKE 1 TABLET TWICE A  Tablet 3    simvastatin (ZOCOR) 10 MG Tab Take 1 Tablet by mouth every evening. 90 Tablet 0    valsartan (DIOVAN) 160 MG Tab Take 1 Tablet by mouth 2 times a day. 180 Tablet 0    NON SPECIFIED 25 mg.      linezolid (ZYVOX) 600 MG Tab Take 600 mg by mouth 2 times a day.      metaxalone (SKELAXIN) 800 MG Tab TAKE 1 TABLET BY MOUTH THREE TIMES DAILY FOR 7 DAYS      Naloxone (NARCAN) 4 MG/0.1ML Liquid 4 mg.      Naloxone (NARCAN) 4 MG/0.1ML Liquid CALL 911. SPR CONTENTS OF ONE SPRAYER (0.1ML) INTO ONE NOSTRIL. REPEAT IN 2-3 MIN IF SYMPTOMS OF OPIOID EMERGENCY PERSIST, ALTERNATE NOSTRILS      oxyCODONE-acetaminophen (PERCOCET) 5-325 MG Tab TAKE 1 TABLET BY MOUTH EVERY 6 HOURS FOR 5 DAYS AS NEEDED FOR MODERATE PAIN      predniSONE (DELTASONE) 20 MG Tab TAKE 1 TABLET BY MOUTH DAILY FOR 3 DAYS.      sildenafil citrate (VIAGRA)  "100 MG tablet Take 50 mg by mouth as needed for Erectile Dysfunction. (Patient not taking: Reported on 5/23/2023)      ANDROGEL PUMP 20.25 MG/ACT (1.62%) Gel DARWIN 2 PUMPS TO EACH SHOULDER AND UPPER ARM QAM (Patient not taking: Reported on 5/23/2023)  0     No current facility-administered medications for this visit.       Physical Exam:  Vitals:    05/23/23 0756   BP: 118/80   BP Location: Left arm   Patient Position: Sitting   BP Cuff Size: Adult   Pulse: 63   Resp: 14   SpO2: 97%   Weight: 118 kg (261 lb)   Height: 1.854 m (6' 1\")     General appearance: NAD, conversant   Eyes: anicteric sclerae, moist conjunctivae; no lid-lag; PERRLA  HENT: Atraumatic; oropharynx clear with moist mucous membranes and no mucosal ulcerations; normal hard and soft palate  Neck: Trachea midline; FROM, supple, no thyromegaly or lymphadenopathy  Lungs: CTA, with normal respiratory effort and no intercostal retractions  CV: Irregular, no MRGs, no JVD   Abdomen: Soft, non-tender; no masses or HSM  Extremities: No peripheral edema or extremity lymphadenopathy  Skin: Normal temperature, turgor and texture; no rash, ulcers or subcutaneous nodules  Psych: Appropriate affect, alert and oriented to person, place and time    Data:  Lipids:   No results found for: CHOLSTRLTOT, TRIGLYCERIDE, HDL, LDL     BMP:  Lab Results   Component Value Date/Time    SODIUM 137 12/17/2016 0140    POTASSIUM 3.9 12/17/2016 0140    CHLORIDE 104 12/17/2016 0140    CO2 27 12/17/2016 0140    GLUCOSE 104 (H) 12/17/2016 0140    BUN 14 12/17/2016 0140    CREATININE 0.80 12/17/2016 0140    CALCIUM 9.0 12/17/2016 0140    ANION 6.0 12/17/2016 0140        TSH:   No results found for: TSHULTRASEN     THYROXINE (T4):   No results found for: FREEDIR     CBC:   Lab Results   Component Value Date/Time    WBC 4.5 (L) 12/16/2016 02:14 AM    RBC 4.15 (L) 12/16/2016 02:14 AM    HEMOGLOBIN 13.4 (L) 12/16/2016 02:14 AM    HEMATOCRIT 39.7 (L) 12/16/2016 02:14 AM    MCV 95.7 12/16/2016 " 02:14 AM    MCH 32.3 12/16/2016 02:14 AM    MCHC 33.8 12/16/2016 02:14 AM    RDW 46.0 12/16/2016 02:14 AM    PLATELETCT 154 (L) 12/16/2016 02:14 AM    MPV 10.0 12/16/2016 02:14 AM    NEUTSPOLYS 43.40 (L) 12/16/2016 02:14 AM    LYMPHOCYTES 37.10 12/16/2016 02:14 AM    MONOCYTES 13.10 12/16/2016 02:14 AM    EOSINOPHILS 5.60 12/16/2016 02:14 AM    BASOPHILS 0.40 12/16/2016 02:14 AM    IMMGRAN 0.40 12/16/2016 02:14 AM    NRBC 0.00 12/16/2016 02:14 AM    NEUTS 1.95 12/16/2016 02:14 AM    LYMPHS 1.67 12/16/2016 02:14 AM    MONOS 0.59 12/16/2016 02:14 AM    EOS 0.25 12/16/2016 02:14 AM    BASO 0.02 12/16/2016 02:14 AM    IMMGRANAB 0.02 12/16/2016 02:14 AM    NRBCAB 0.00 12/16/2016 02:14 AM        CBC w/o DIFF  Lab Results   Component Value Date/Time    WBC 4.5 (L) 12/16/2016 02:14 AM    RBC 4.15 (L) 12/16/2016 02:14 AM    HEMOGLOBIN 13.4 (L) 12/16/2016 02:14 AM    MCV 95.7 12/16/2016 02:14 AM    MCH 32.3 12/16/2016 02:14 AM    MCHC 33.8 12/16/2016 02:14 AM    RDW 46.0 12/16/2016 02:14 AM    MPV 10.0 12/16/2016 02:14 AM       Prior echo/stress results reviewed: Normal EF    EKG interpreted by me: Afib    Impression/Plan:  1. Atrial fibrillation, persistent (HCC)  EKG    CL-EP ABLATION ATRIAL FIBRILLATION    EC-MIESHA W/O CONT        1.  Persistent atrial fibrillation, longstanding  2.  Hypercoagulable state due to atrial fibrillation    -We discussed treatment options for atrial fibrillation at length.  He has failed antiarrhythmic therapy.  We did discuss catheter ablation.  We also discussed hybrid maze.  At this time he would like to proceed with minimally invasive approach.  - We discussed pulmonary vein isolation for therapeutic management and continued rhythm control.  We discussed the risks and benefits of this procedure.  Risks include 1-3% risk of major cardiovascular event including stroke, myocardial infarction, phrenic nerve damage, esophageal injury and/or fistula formation, cardiac perforation, pericardial  effusion, tamponade, major bleeding, or death.  I quoted a 70% chance free of atrial fibrillation at 12 months.  We discussed that he may also need a second procedure.    We will go ahead and schedule ablation for longstanding persistent atrial fibrillation.  I will plan to perform PVI, posterior wall isolation, vein of Pete ethanol ablation and flutter lines.    Zeb Ott MD  Cardiac Electrophysiology

## 2023-05-23 NOTE — TELEPHONE ENCOUNTER
Patient scheduled for afib ablation w/MIESHA on 8-11-23 with Dr. Ott. Patient has been instructed to check in at 8:00 for 10:00 case time. Message sent to authorizations. Emailed Carto.

## 2023-05-23 NOTE — TELEPHONE ENCOUNTER
----- Message from Zeb Ott M.D. sent at 5/23/2023 10:02 AM PDT -----  Please schedule afib ablation, no meds to hold, for August, thanks

## 2023-05-25 ENCOUNTER — TELEPHONE (OUTPATIENT)
Dept: CARDIOLOGY | Facility: MEDICAL CENTER | Age: 68
End: 2023-05-25
Payer: MEDICARE

## 2023-05-25 NOTE — TELEPHONE ENCOUNTER
PCP progress note was requested, labs were requested and received, lab order was mailed to pt to address provided. Lab was going to be faxed to [facility] but could not get a hold of pt to ask what quest to fax to.

## 2023-05-30 ENCOUNTER — APPOINTMENT (OUTPATIENT)
Dept: ADMISSIONS | Facility: MEDICAL CENTER | Age: 68
End: 2023-05-30
Attending: INTERNAL MEDICINE
Payer: MEDICARE

## 2023-06-10 LAB — EKG IMPRESSION: NORMAL

## 2023-07-22 DIAGNOSIS — I48.0 PAF (PAROXYSMAL ATRIAL FIBRILLATION) (HCC): ICD-10-CM

## 2023-07-22 DIAGNOSIS — E03.9 HYPOTHYROIDISM, UNSPECIFIED TYPE: ICD-10-CM

## 2023-07-24 ENCOUNTER — PRE-ADMISSION TESTING (OUTPATIENT)
Dept: ADMISSIONS | Facility: MEDICAL CENTER | Age: 68
End: 2023-07-24
Attending: INTERNAL MEDICINE
Payer: MEDICARE

## 2023-07-26 ENCOUNTER — APPOINTMENT (OUTPATIENT)
Dept: CARDIOLOGY | Facility: MEDICAL CENTER | Age: 68
End: 2023-07-26
Attending: INTERNAL MEDICINE
Payer: MEDICARE

## 2023-07-26 RX ORDER — APIXABAN 5 MG/1
5 TABLET, FILM COATED ORAL 2 TIMES DAILY
Qty: 180 TABLET | Refills: 0 | Status: SHIPPED | OUTPATIENT
Start: 2023-07-26 | End: 2023-10-09

## 2023-07-27 RX ORDER — LEVOTHYROXINE SODIUM 0.15 MG/1
TABLET ORAL
Qty: 90 TABLET | Refills: 0 | OUTPATIENT
Start: 2023-07-27

## 2023-07-27 NOTE — TELEPHONE ENCOUNTER
Noted below:  Dalton Darden M.D.  to BRANDI Daniel      7/27/23  8:08 AM   He has to get this from his PCP   July 26, 2023  Dalton Darden M.D.  to BRANDI Gilliam      7/26/23 10:16 PM   I am not responsible for thyroid   This is not a cardiac medication   Will need to go to PCP

## 2023-08-07 ENCOUNTER — PRE-ADMISSION TESTING (OUTPATIENT)
Dept: ADMISSIONS | Facility: MEDICAL CENTER | Age: 68
End: 2023-08-07
Attending: INTERNAL MEDICINE
Payer: MEDICARE

## 2023-08-07 DIAGNOSIS — Z01.810 PRE-OPERATIVE CARDIOVASCULAR EXAMINATION: ICD-10-CM

## 2023-08-07 DIAGNOSIS — Z01.812 PRE-OPERATIVE LABORATORY EXAMINATION: ICD-10-CM

## 2023-08-07 LAB
BASOPHILS # BLD AUTO: 0.6 % (ref 0–1.8)
BASOPHILS # BLD: 0.04 K/UL (ref 0–0.12)
EKG IMPRESSION: NORMAL
EOSINOPHIL # BLD AUTO: 0.16 K/UL (ref 0–0.51)
EOSINOPHIL NFR BLD: 2.4 % (ref 0–6.9)
ERYTHROCYTE [DISTWIDTH] IN BLOOD BY AUTOMATED COUNT: 49.5 FL (ref 35.9–50)
HCT VFR BLD AUTO: 50.8 % (ref 42–52)
HGB BLD-MCNC: 16.8 G/DL (ref 14–18)
IMM GRANULOCYTES # BLD AUTO: 0.03 K/UL (ref 0–0.11)
IMM GRANULOCYTES NFR BLD AUTO: 0.4 % (ref 0–0.9)
INR PPP: 1.3 (ref 0.87–1.13)
LYMPHOCYTES # BLD AUTO: 1.85 K/UL (ref 1–4.8)
LYMPHOCYTES NFR BLD: 27.6 % (ref 22–41)
MCH RBC QN AUTO: 31 PG (ref 27–33)
MCHC RBC AUTO-ENTMCNC: 33.1 G/DL (ref 32.3–36.5)
MCV RBC AUTO: 93.7 FL (ref 81.4–97.8)
MONOCYTES # BLD AUTO: 0.56 K/UL (ref 0–0.85)
MONOCYTES NFR BLD AUTO: 8.4 % (ref 0–13.4)
NEUTROPHILS # BLD AUTO: 4.06 K/UL (ref 1.82–7.42)
NEUTROPHILS NFR BLD: 60.6 % (ref 44–72)
NRBC # BLD AUTO: 0 K/UL
NRBC BLD-RTO: 0 /100 WBC (ref 0–0.2)
PLATELET # BLD AUTO: 163 K/UL (ref 164–446)
PMV BLD AUTO: 11.4 FL (ref 9–12.9)
PROTHROMBIN TIME: 16 SEC (ref 12–14.6)
RBC # BLD AUTO: 5.42 M/UL (ref 4.7–6.1)
WBC # BLD AUTO: 6.7 K/UL (ref 4.8–10.8)

## 2023-08-07 PROCEDURE — 93005 ELECTROCARDIOGRAM TRACING: CPT

## 2023-08-07 PROCEDURE — 93010 ELECTROCARDIOGRAM REPORT: CPT | Performed by: INTERNAL MEDICINE

## 2023-08-07 PROCEDURE — 85025 COMPLETE CBC W/AUTO DIFF WBC: CPT

## 2023-08-07 PROCEDURE — 85610 PROTHROMBIN TIME: CPT

## 2023-08-07 PROCEDURE — 36415 COLL VENOUS BLD VENIPUNCTURE: CPT

## 2023-08-10 ENCOUNTER — TELEPHONE (OUTPATIENT)
Dept: CARDIOLOGY | Facility: MEDICAL CENTER | Age: 68
End: 2023-08-10

## 2023-08-10 NOTE — TELEPHONE ENCOUNTER
Caller: Myke Bustos    Medication Name and Dosage: levothyroxine (SYNTHROID) 150 MCG Tab      Please call your pharmacy and have them send us a refill request or speak to a live representative, RX number may have changed.    Medication amount left: 20 days worth left    Preferred Pharmacy: [facility]    Other questions (Topic): Patient is inquiring for a medication refill. See refill encounter from 07.22. Please advise.     Callback Number (Will only call for issues): [phone] (home)      Thank you,     Lucita MEDINA

## 2023-08-11 ENCOUNTER — HOSPITAL ENCOUNTER (OUTPATIENT)
Facility: MEDICAL CENTER | Age: 68
End: 2023-08-11
Attending: INTERNAL MEDICINE | Admitting: INTERNAL MEDICINE
Payer: MEDICARE

## 2023-08-11 ENCOUNTER — APPOINTMENT (OUTPATIENT)
Dept: CARDIOLOGY | Facility: MEDICAL CENTER | Age: 68
End: 2023-08-11
Attending: INTERNAL MEDICINE
Payer: MEDICARE

## 2023-08-11 ENCOUNTER — ANESTHESIA (OUTPATIENT)
Dept: CARDIOLOGY | Facility: MEDICAL CENTER | Age: 68
End: 2023-08-11
Payer: MEDICARE

## 2023-08-11 ENCOUNTER — ANESTHESIA EVENT (OUTPATIENT)
Dept: CARDIOLOGY | Facility: MEDICAL CENTER | Age: 68
End: 2023-08-11
Payer: MEDICARE

## 2023-08-11 VITALS
RESPIRATION RATE: 18 BRPM | SYSTOLIC BLOOD PRESSURE: 123 MMHG | BODY MASS INDEX: 34.42 KG/M2 | HEIGHT: 73 IN | HEART RATE: 75 BPM | OXYGEN SATURATION: 96 % | TEMPERATURE: 96.4 F | WEIGHT: 259.7 LBS | DIASTOLIC BLOOD PRESSURE: 80 MMHG

## 2023-08-11 DIAGNOSIS — I48.19 ATRIAL FIBRILLATION, PERSISTENT (HCC): ICD-10-CM

## 2023-08-11 DIAGNOSIS — Z98.890 S/P ABLATION OF ATRIAL FIBRILLATION: ICD-10-CM

## 2023-08-11 DIAGNOSIS — Z86.79 S/P ABLATION OF ATRIAL FIBRILLATION: ICD-10-CM

## 2023-08-11 LAB
ACT BLD: 287 SEC (ref 74–137)
ACT BLD: 299 SEC (ref 74–137)
ACT BLD: 305 SEC (ref 74–137)
ACT BLD: 335 SEC (ref 74–137)
ACT BLD: 335 SEC (ref 74–137)
ALBUMIN SERPL BCP-MCNC: 4.2 G/DL (ref 3.2–4.9)
ALBUMIN/GLOB SERPL: 1.4 G/DL
ALP SERPL-CCNC: 71 U/L (ref 30–99)
ALT SERPL-CCNC: 22 U/L (ref 2–50)
ANION GAP SERPL CALC-SCNC: 11 MMOL/L (ref 7–16)
AST SERPL-CCNC: 25 U/L (ref 12–45)
BILIRUB SERPL-MCNC: 0.7 MG/DL (ref 0.1–1.5)
BUN SERPL-MCNC: 26 MG/DL (ref 8–22)
CALCIUM ALBUM COR SERPL-MCNC: 8.8 MG/DL (ref 8.5–10.5)
CALCIUM SERPL-MCNC: 9 MG/DL (ref 8.5–10.5)
CHLORIDE SERPL-SCNC: 107 MMOL/L (ref 96–112)
CO2 SERPL-SCNC: 20 MMOL/L (ref 20–33)
CREAT SERPL-MCNC: 0.85 MG/DL (ref 0.5–1.4)
EKG IMPRESSION: NORMAL
GFR SERPLBLD CREATININE-BSD FMLA CKD-EPI: 96 ML/MIN/1.73 M 2
GLOBULIN SER CALC-MCNC: 2.9 G/DL (ref 1.9–3.5)
GLUCOSE SERPL-MCNC: 102 MG/DL (ref 65–99)
INR PPP: 1.24 (ref 0.87–1.13)
POTASSIUM SERPL-SCNC: 4.7 MMOL/L (ref 3.6–5.5)
PROT SERPL-MCNC: 7.1 G/DL (ref 6–8.2)
PROTHROMBIN TIME: 15.5 SEC (ref 12–14.6)
SODIUM SERPL-SCNC: 138 MMOL/L (ref 135–145)

## 2023-08-11 PROCEDURE — 93312 ECHO TRANSESOPHAGEAL: CPT | Mod: 26 | Performed by: INTERNAL MEDICINE

## 2023-08-11 PROCEDURE — 160035 HCHG PACU - 1ST 60 MINS PHASE I

## 2023-08-11 PROCEDURE — 93655 ICAR CATH ABLTJ DSCRT ARRHYT: CPT | Performed by: INTERNAL MEDICINE

## 2023-08-11 PROCEDURE — 93005 ELECTROCARDIOGRAM TRACING: CPT | Performed by: INTERNAL MEDICINE

## 2023-08-11 PROCEDURE — 93657 TX L/R ATRIAL FIB ADDL: CPT | Performed by: INTERNAL MEDICINE

## 2023-08-11 PROCEDURE — 160046 HCHG PACU - 1ST 60 MINS PHASE II

## 2023-08-11 PROCEDURE — 700101 HCHG RX REV CODE 250: Performed by: STUDENT IN AN ORGANIZED HEALTH CARE EDUCATION/TRAINING PROGRAM

## 2023-08-11 PROCEDURE — 700111 HCHG RX REV CODE 636 W/ 250 OVERRIDE (IP): Performed by: STUDENT IN AN ORGANIZED HEALTH CARE EDUCATION/TRAINING PROGRAM

## 2023-08-11 PROCEDURE — 93010 ELECTROCARDIOGRAM REPORT: CPT | Performed by: INTERNAL MEDICINE

## 2023-08-11 PROCEDURE — 700111 HCHG RX REV CODE 636 W/ 250 OVERRIDE (IP)

## 2023-08-11 PROCEDURE — 93325 DOPPLER ECHO COLOR FLOW MAPG: CPT

## 2023-08-11 PROCEDURE — 85610 PROTHROMBIN TIME: CPT

## 2023-08-11 PROCEDURE — 36415 COLL VENOUS BLD VENIPUNCTURE: CPT

## 2023-08-11 PROCEDURE — 160002 HCHG RECOVERY MINUTES (STAT)

## 2023-08-11 PROCEDURE — 160036 HCHG PACU - EA ADDL 30 MINS PHASE I

## 2023-08-11 PROCEDURE — 700101 HCHG RX REV CODE 250: Performed by: INTERNAL MEDICINE

## 2023-08-11 PROCEDURE — 700105 HCHG RX REV CODE 258: Mod: JZ | Performed by: INTERNAL MEDICINE

## 2023-08-11 PROCEDURE — 700117 HCHG RX CONTRAST REV CODE 255: Performed by: INTERNAL MEDICINE

## 2023-08-11 PROCEDURE — 93656 COMPRE EP EVAL ABLTJ ATR FIB: CPT | Performed by: INTERNAL MEDICINE

## 2023-08-11 PROCEDURE — 700101 HCHG RX REV CODE 250

## 2023-08-11 PROCEDURE — 80053 COMPREHEN METABOLIC PANEL: CPT

## 2023-08-11 PROCEDURE — 93623 PRGRMD STIMJ&PACG IV RX NFS: CPT | Mod: 26 | Performed by: INTERNAL MEDICINE

## 2023-08-11 PROCEDURE — 160047 HCHG PACU  - EA ADDL 30 MINS PHASE II

## 2023-08-11 PROCEDURE — 85347 COAGULATION TIME ACTIVATED: CPT | Mod: 91

## 2023-08-11 PROCEDURE — 93623 PRGRMD STIMJ&PACG IV RX NFS: CPT

## 2023-08-11 RX ORDER — HEPARIN SODIUM 1000 [USP'U]/ML
INJECTION, SOLUTION INTRAVENOUS; SUBCUTANEOUS
Status: COMPLETED
Start: 2023-08-11 | End: 2023-08-11

## 2023-08-11 RX ORDER — DIPHENHYDRAMINE HYDROCHLORIDE 50 MG/ML
12.5 INJECTION INTRAMUSCULAR; INTRAVENOUS
Status: DISCONTINUED | OUTPATIENT
Start: 2023-08-11 | End: 2023-08-11 | Stop reason: HOSPADM

## 2023-08-11 RX ORDER — PROTAMINE SULFATE 10 MG/ML
INJECTION, SOLUTION INTRAVENOUS
Status: COMPLETED
Start: 2023-08-11 | End: 2023-08-11

## 2023-08-11 RX ORDER — LIDOCAINE HYDROCHLORIDE 20 MG/ML
INJECTION, SOLUTION INFILTRATION; PERINEURAL
Status: COMPLETED
Start: 2023-08-11 | End: 2023-08-11

## 2023-08-11 RX ORDER — PHENYLEPHRINE HYDROCHLORIDE 10 MG/ML
INJECTION, SOLUTION INTRAMUSCULAR; INTRAVENOUS; SUBCUTANEOUS PRN
Status: DISCONTINUED | OUTPATIENT
Start: 2023-08-11 | End: 2023-08-11 | Stop reason: SURG

## 2023-08-11 RX ORDER — MIDAZOLAM HYDROCHLORIDE 1 MG/ML
INJECTION INTRAMUSCULAR; INTRAVENOUS
Status: COMPLETED
Start: 2023-08-11 | End: 2023-08-11

## 2023-08-11 RX ORDER — MEPERIDINE HYDROCHLORIDE 25 MG/ML
12.5 INJECTION INTRAMUSCULAR; INTRAVENOUS; SUBCUTANEOUS
Status: DISCONTINUED | OUTPATIENT
Start: 2023-08-11 | End: 2023-08-11 | Stop reason: HOSPADM

## 2023-08-11 RX ORDER — BUPIVACAINE HYDROCHLORIDE 5 MG/ML
INJECTION, SOLUTION EPIDURAL; INTRACAUDAL
Status: COMPLETED
Start: 2023-08-11 | End: 2023-08-11

## 2023-08-11 RX ORDER — HYDROMORPHONE HYDROCHLORIDE 1 MG/ML
0.4 INJECTION, SOLUTION INTRAMUSCULAR; INTRAVENOUS; SUBCUTANEOUS
Status: DISCONTINUED | OUTPATIENT
Start: 2023-08-11 | End: 2023-08-11 | Stop reason: HOSPADM

## 2023-08-11 RX ORDER — LIDOCAINE HYDROCHLORIDE 20 MG/ML
INJECTION, SOLUTION EPIDURAL; INFILTRATION; INTRACAUDAL; PERINEURAL PRN
Status: DISCONTINUED | OUTPATIENT
Start: 2023-08-11 | End: 2023-08-11 | Stop reason: SURG

## 2023-08-11 RX ORDER — HYDROMORPHONE HYDROCHLORIDE 1 MG/ML
0.2 INJECTION, SOLUTION INTRAMUSCULAR; INTRAVENOUS; SUBCUTANEOUS
Status: DISCONTINUED | OUTPATIENT
Start: 2023-08-11 | End: 2023-08-11 | Stop reason: HOSPADM

## 2023-08-11 RX ORDER — HYDROMORPHONE HYDROCHLORIDE 1 MG/ML
0.1 INJECTION, SOLUTION INTRAMUSCULAR; INTRAVENOUS; SUBCUTANEOUS
Status: DISCONTINUED | OUTPATIENT
Start: 2023-08-11 | End: 2023-08-11 | Stop reason: HOSPADM

## 2023-08-11 RX ORDER — HALOPERIDOL 5 MG/ML
1 INJECTION INTRAMUSCULAR
Status: DISCONTINUED | OUTPATIENT
Start: 2023-08-11 | End: 2023-08-11 | Stop reason: HOSPADM

## 2023-08-11 RX ORDER — ALCOHOL 1 ML/ML
10 INJECTION, SOLUTION PERCUTANEOUS ONCE
Status: COMPLETED | OUTPATIENT
Start: 2023-08-11 | End: 2023-08-11

## 2023-08-11 RX ORDER — ONDANSETRON 2 MG/ML
4 INJECTION INTRAMUSCULAR; INTRAVENOUS
Status: DISCONTINUED | OUTPATIENT
Start: 2023-08-11 | End: 2023-08-11 | Stop reason: HOSPADM

## 2023-08-11 RX ORDER — DEXAMETHASONE SODIUM PHOSPHATE 4 MG/ML
INJECTION, SOLUTION INTRA-ARTICULAR; INTRALESIONAL; INTRAMUSCULAR; INTRAVENOUS; SOFT TISSUE PRN
Status: DISCONTINUED | OUTPATIENT
Start: 2023-08-11 | End: 2023-08-11 | Stop reason: SURG

## 2023-08-11 RX ORDER — SODIUM CHLORIDE, SODIUM LACTATE, POTASSIUM CHLORIDE, CALCIUM CHLORIDE 600; 310; 30; 20 MG/100ML; MG/100ML; MG/100ML; MG/100ML
INJECTION, SOLUTION INTRAVENOUS CONTINUOUS
Status: DISCONTINUED | OUTPATIENT
Start: 2023-08-11 | End: 2023-08-11 | Stop reason: HOSPADM

## 2023-08-11 RX ORDER — HEPARIN SODIUM 200 [USP'U]/100ML
INJECTION, SOLUTION INTRAVENOUS
Status: COMPLETED
Start: 2023-08-11 | End: 2023-08-11

## 2023-08-11 RX ORDER — ISOPROTERENOL HYDROCHLORIDE 0.2 MG/ML
INJECTION, SOLUTION INTRAVENOUS
Status: COMPLETED
Start: 2023-08-11 | End: 2023-08-11

## 2023-08-11 RX ORDER — OMEPRAZOLE 20 MG/1
20 CAPSULE, DELAYED RELEASE ORAL DAILY
Qty: 30 CAPSULE | Refills: 0 | Status: ON HOLD | OUTPATIENT
Start: 2023-08-11 | End: 2023-09-29

## 2023-08-11 RX ORDER — PROTAMINE SULFATE 10 MG/ML
INJECTION, SOLUTION INTRAVENOUS PRN
Status: DISCONTINUED | OUTPATIENT
Start: 2023-08-11 | End: 2023-08-11 | Stop reason: SURG

## 2023-08-11 RX ORDER — ONDANSETRON 2 MG/ML
INJECTION INTRAMUSCULAR; INTRAVENOUS PRN
Status: DISCONTINUED | OUTPATIENT
Start: 2023-08-11 | End: 2023-08-11 | Stop reason: SURG

## 2023-08-11 RX ORDER — SODIUM CHLORIDE, SODIUM LACTATE, POTASSIUM CHLORIDE, CALCIUM CHLORIDE 600; 310; 30; 20 MG/100ML; MG/100ML; MG/100ML; MG/100ML
INJECTION, SOLUTION INTRAVENOUS CONTINUOUS
Status: ACTIVE | OUTPATIENT
Start: 2023-08-11 | End: 2023-08-11

## 2023-08-11 RX ADMIN — PHENYLEPHRINE HYDROCHLORIDE 100 MCG: 10 INJECTION INTRAVENOUS at 12:45

## 2023-08-11 RX ADMIN — MIDAZOLAM 1 MG: 1 INJECTION, SOLUTION INTRAMUSCULAR; INTRAVENOUS at 13:54

## 2023-08-11 RX ADMIN — HEPARIN SODIUM: 1000 INJECTION, SOLUTION INTRAVENOUS; SUBCUTANEOUS at 11:32

## 2023-08-11 RX ADMIN — PROTAMINE SULFATE 50 MG: 10 INJECTION, SOLUTION INTRAVENOUS at 13:47

## 2023-08-11 RX ADMIN — SUGAMMADEX 200 MG: 100 INJECTION, SOLUTION INTRAVENOUS at 13:53

## 2023-08-11 RX ADMIN — PROPOFOL 200 MG: 10 INJECTION, EMULSION INTRAVENOUS at 11:29

## 2023-08-11 RX ADMIN — PHENYLEPHRINE HYDROCHLORIDE 100 MCG: 10 INJECTION INTRAVENOUS at 11:53

## 2023-08-11 RX ADMIN — FENTANYL CITRATE 50 MCG: 50 INJECTION, SOLUTION INTRAMUSCULAR; INTRAVENOUS at 13:53

## 2023-08-11 RX ADMIN — MIDAZOLAM 1 MG: 1 INJECTION, SOLUTION INTRAMUSCULAR; INTRAVENOUS at 11:25

## 2023-08-11 RX ADMIN — LIDOCAINE HYDROCHLORIDE: 20 INJECTION, SOLUTION INFILTRATION; PERINEURAL at 11:32

## 2023-08-11 RX ADMIN — HEPARIN SODIUM 3000 ML: 200 INJECTION, SOLUTION INTRAVENOUS at 11:00

## 2023-08-11 RX ADMIN — ROCURONIUM BROMIDE 80 MG: 10 INJECTION, SOLUTION INTRAVENOUS at 11:29

## 2023-08-11 RX ADMIN — ROCURONIUM BROMIDE 10 MG: 10 INJECTION, SOLUTION INTRAVENOUS at 13:04

## 2023-08-11 RX ADMIN — ROCURONIUM BROMIDE 20 MG: 10 INJECTION, SOLUTION INTRAVENOUS at 12:35

## 2023-08-11 RX ADMIN — IOHEXOL 22 ML: 350 INJECTION, SOLUTION INTRAVENOUS at 13:00

## 2023-08-11 RX ADMIN — ROCURONIUM BROMIDE 20 MG: 10 INJECTION, SOLUTION INTRAVENOUS at 12:03

## 2023-08-11 RX ADMIN — ONDANSETRON 4 MG: 2 INJECTION INTRAMUSCULAR; INTRAVENOUS at 13:52

## 2023-08-11 RX ADMIN — FENTANYL CITRATE 25 MCG: 50 INJECTION, SOLUTION INTRAMUSCULAR; INTRAVENOUS at 11:29

## 2023-08-11 RX ADMIN — HEPARIN SODIUM: 1000 INJECTION, SOLUTION INTRAVENOUS; SUBCUTANEOUS at 12:30

## 2023-08-11 RX ADMIN — EPHEDRINE SULFATE 10 MG: 50 INJECTION, SOLUTION INTRAVENOUS at 11:55

## 2023-08-11 RX ADMIN — BUPIVACAINE HYDROCHLORIDE: 5 INJECTION, SOLUTION EPIDURAL; INTRACAUDAL at 11:32

## 2023-08-11 RX ADMIN — DEXAMETHASONE SODIUM PHOSPHATE 4 MG: 4 INJECTION INTRA-ARTICULAR; INTRALESIONAL; INTRAMUSCULAR; INTRAVENOUS; SOFT TISSUE at 11:29

## 2023-08-11 RX ADMIN — ISOPROTERENOL HYDROCHLORIDE 0.2 MG: 0.2 INJECTION, SOLUTION INTRACARDIAC; INTRAMUSCULAR; INTRAVENOUS; SUBCUTANEOUS at 13:53

## 2023-08-11 RX ADMIN — ROCURONIUM BROMIDE 10 MG: 10 INJECTION, SOLUTION INTRAVENOUS at 13:40

## 2023-08-11 RX ADMIN — PHENYLEPHRINE HYDROCHLORIDE 100 MCG: 10 INJECTION INTRAVENOUS at 11:49

## 2023-08-11 RX ADMIN — FENTANYL CITRATE 25 MCG: 50 INJECTION, SOLUTION INTRAMUSCULAR; INTRAVENOUS at 11:25

## 2023-08-11 RX ADMIN — LIDOCAINE HYDROCHLORIDE 100 MG: 20 INJECTION, SOLUTION EPIDURAL; INFILTRATION; INTRACAUDAL at 11:29

## 2023-08-11 RX ADMIN — SODIUM CHLORIDE, POTASSIUM CHLORIDE, SODIUM LACTATE AND CALCIUM CHLORIDE: 600; 310; 30; 20 INJECTION, SOLUTION INTRAVENOUS at 08:31

## 2023-08-11 RX ADMIN — ALCOHOL 10 ML: 1 INJECTION, SOLUTION PERCUTANEOUS at 11:15

## 2023-08-11 ASSESSMENT — PAIN DESCRIPTION - PAIN TYPE: TYPE: SURGICAL PAIN

## 2023-08-11 NOTE — ANESTHESIA TIME REPORT
Anesthesia Start and Stop Event Times     Date Time Event    8/11/2023 1117 Ready for Procedure     1120 Anesthesia Start     1410 Anesthesia Stop        Responsible Staff  08/11/23    Name Role Begin End    Mauricio Yusuf M.D. Anesth 1120 1410        Overtime Reason:  no overtime (within assigned shift)    Comments:

## 2023-08-11 NOTE — ANESTHESIA PROCEDURE NOTES
Airway    Date/Time: 8/11/2023 11:31 AM    Performed by: Mauricio Yusuf M.D.  Authorized by: Mauricio Yusuf M.D.    Location:  OR  Urgency:  Elective  Indications for Airway Management:  Anesthesia      Spontaneous Ventilation: absent    Sedation Level:  Deep  Preoxygenated: Yes    Patient Position:  Sniffing  Final Airway Type:  Endotracheal airway  Final Endotracheal Airway:  ETT  Cuffed: Yes    Technique Used for Successful ETT Placement:  Direct laryngoscopy    Insertion Site:  Oral  Blade Type:  Renaldo  Laryngoscope Blade/Videolaryngoscope Blade Size:  3  ETT Size (mm):  7.5  Measured from:  Teeth  ETT to Teeth (cm):  24  Placement Verified by: auscultation and capnometry    Cormack-Lehane Classification:  Grade IIa - partial view of glottis  Number of Attempts at Approach:  1

## 2023-08-11 NOTE — ANESTHESIA PREPROCEDURE EVALUATION
Date/Time: 08/11/23 1030    Scheduled providers: Zeb Ott M.D.; Mauricio Yusuf M.D.    Procedure: CL-EP ABLATION ATRIAL FIBRILLATION    Diagnosis:       Atrial fibrillation, persistent (HCC) [I48.19]      Other persistent atrial fibrillation [I48.19]    Indications: See Associated Dx    Location: [facility] Cath Lab - [facility]          Relevant Problems   ANESTHESIA   (positive) Sleep apnea      CARDIAC   (positive) Essential hypertension, benign   (positive) PAF (paroxysmal atrial fibrillation) (HCC)      ENDO   (positive) Hypothyroidism       Physical Exam    Airway   Mallampati: II  TM distance: >3 FB  Neck ROM: full       Cardiovascular - normal exam  Rhythm: regular  Rate: normal     Dental - normal exam           Pulmonary - normal exam     Abdominal    Neurological - normal exam                 Anesthesia Plan    ASA 2       Plan - general       Airway plan will be ETT          Induction: intravenous    Postoperative Plan: Postoperative administration of opioids is intended.    Pertinent diagnostic labs and testing reviewed    Informed Consent:    Anesthetic plan and risks discussed with patient.    Use of blood products discussed with: patient whom consented to blood products.

## 2023-08-11 NOTE — H&P
[facility]  Electrophysiology Pre-procedure H&P    DOS:8/11/2023    Planned Procedure: AF ablation    Chief complaint/Reason for Procedure: Afib    HPI: 69 y/o M with persistent afib for ablation      Past Medical History:   Diagnosis Date    Atrial fibrillation (HCC) 12/29/2011    Chickenpox     Essential hypertension, benign 07/24/2023    states controlled on meds    Hemorrhagic disorder (HCC)     on eliquis    High cholesterol     Long term (current) use of anticoagulants 12/29/2011    Obesity 12/29/2011    Other general symptoms(780.99)     Pure hypercholesterolemia 12/29/2011    Sleep apnea 12/29/2011    cpap    Unspecified hypothyroidism 12/29/2011       Past Surgical History:   Procedure Laterality Date    APPENDECTOMY      1994       Social History     Socioeconomic History    Marital status:      Spouse name: Not on file    Number of children: Not on file    Years of education: Not on file    Highest education level: Not on file   Occupational History    Not on file   Tobacco Use    Smoking status: Never    Smokeless tobacco: Never   Vaping Use    Vaping Use: Never used   Substance and Sexual Activity    Alcohol use: No    Drug use: Not Currently    Sexual activity: Not on file   Other Topics Concern    Not on file   Social History Narrative    Not on file     Social Determinants of Health     Financial Resource Strain: Not on file   Food Insecurity: Not on file   Transportation Needs: Not on file   Physical Activity: Not on file   Stress: Not on file   Social Connections: Not on file   Intimate Partner Violence: Not on file   Housing Stability: Not on file       Family History   Problem Relation Age of Onset    Heart Disease Mother         ACUTE MI IN HER 60s.    Heart Disease Father         ACUTE MI IN HIS 50s.       No Known Allergies    Current Facility-Administered Medications   Medication Dose Route Frequency Provider Last Rate Last Admin    lidocaine (Xylocaine) 1 %  "injection 0.5 mL  0.5 mL Intradermal Once PRN Zeb Ott M.D.        lactated ringers infusion   Intravenous Continuous Zeb Ott M.D. 10 mL/hr at 08/11/23 0831 New Bag at 08/11/23 0831    BUPIVACAINE HCL (PF) 0.5 % INJ SOLN             HEPARIN SODIUM (PORCINE) 1000 UNIT/ML INJ SOLN             LIDOCAINE HCL 2 % INJ SOLN             HEPARIN (PORCINE) IN NACL 2000-0.9 UNIT/L-% IV SOLN                Physical Exam:  Vitals:    08/11/23 0800   BP: 123/81   Pulse: 65   Resp: 16   Temp: 36.3 °C (97.4 °F)   TempSrc: Temporal   SpO2: 93%   Weight: 118 kg (259 lb 11.2 oz)   Height: 1.854 m (6' 1\")     General appearance: NAD, conversant   Neck: Trachea midline; FROM, supple, no thyromegaly or lymphadenopathy  CV: RRR, no MRGs, no JVD   Extremities: No peripheral edema or extremity lymphadenopathy  Skin: Normal temperature, turgor and texture; no rash, ulcers or subcutaneous nodules  Psych: Appropriate affect, alert and oriented to person, place and time    Data:  No results found for: CHOLSTRLTOT, LDL, HDL, TRIGLYCERIDE    Lab Results   Component Value Date/Time    SODIUM 138 08/11/2023 08:29 AM    POTASSIUM 4.7 08/11/2023 08:29 AM    CHLORIDE 107 08/11/2023 08:29 AM    CO2 20 08/11/2023 08:29 AM    GLUCOSE 102 (H) 08/11/2023 08:29 AM    BUN 26 (H) 08/11/2023 08:29 AM    CREATININE 0.85 08/11/2023 08:29 AM     Lab Results   Component Value Date/Time    ALKPHOSPHAT 71 08/11/2023 08:29 AM    ASTSGOT 25 08/11/2023 08:29 AM    ALTSGPT 22 08/11/2023 08:29 AM    TBILIRUBIN 0.7 08/11/2023 08:29 AM      No results found for: BNPBTYPENAT            EKG interpreted by me: Afib    Impression/Plan:  1) Persistent afib    Plan AF ablation. We discussed pulmonary vein isolation for therapeutic management and continued rhythm control.  We discussed the risks and benefits of this procedure.  Risks include 1-3% risk of major cardiovascular event including stroke, myocardial infarction, phrenic nerve damage, esophageal injury " and/or fistula formation, cardiac perforation, pericardial effusion, tamponade, major bleeding, or death.  I quoted a 70% chance free of atrial fibrillation at 12 months.  We discussed that he may also need a second procedure.        Zeb Ott MD  Cardiac Electrophysiology

## 2023-08-11 NOTE — ANESTHESIA PROCEDURE NOTES
Arterial Line    Performed by: Mauricio Yusuf M.D.  Authorized by: Mauricio Yusuf M.D.    Start Time:  8/11/2023 11:30 AM  End Time:  8/11/2023 11:33 AM  Localization: surface landmarks    Patient Location:  OR  Indication: continuous blood pressure monitoring        Catheter Size:  20 G  Seldinger Technique?: Yes    Site:  Radial artery  Line Secured:  Antimicrobial disc, tape and transparent dressing  Events: patient tolerated procedure well with no complications

## 2023-08-11 NOTE — DISCHARGE INSTRUCTIONS
HOME CARE INSTRUCTIONS    ACTIVITY: Rest and take it easy for the first 24 hours.  A responsible adult is recommended to remain with you during that time.  It is normal to feel sleepy.  We encourage you to not do anything that requires balance, judgment or coordination.    FOR 24 HOURS DO NOT:  Drive, operate machinery or run household appliances.  Drink beer or alcoholic beverages.  Make important decisions or sign legal documents.    SPECIAL INSTRUCTIONS:   [facility] Post Ablation Patient Instructions:  No lifting > 10 lbs x 1 week.      No soaking in baths, hot tubs, pools x 1 week.  May shower the day after discharge and take off groin dressings and leave  sites uncovered.  Continue to monitor sites daily for warmth, redness, discolored drainage.  It is common to have a small lump in the area where the cather was (usually the size of a marble); this will go away but takes approximately 6 weeks to normalize.     3.   Please take all medications as prescribed to you; please do not stop any medications prescribed post ablation unless directed by your healthcare provider.      4.   Please do not miss any doses of your blood thinner (if you have been started on, or take chronic blood thinners) without discussion with your healthcare provider first.     5.   Please walk and take deep breaths after discharge.  After discharge, if you experience neurological changes/signs of stroke or high fever you should be seen in the emergency dept.     6.   It is possible you may experience some chest discomfort or chest tightness post ablation.  This is usually secondary to inflammation and irritation of the tissues at the area of the ablation.  If this occurs, it is advised to try 400 mg of Ibuprofen with food as needed up to three times a day for a maximum of two days.  This should help to decrease pain and tissue inflammation.          **Please notify the office ([phone]) if this  occurs.         ** DO NOT TAKE Ibuprofen IF HISTORY OF ALLERGY, SIGNIFICANT BLEEDING OR KIDNEY DISEASE WITHOUT DISCUSSING WITH YOUR CARDIOLOGY PROVIDER FIRST.          ** If pain becomes severe or you have additional symptoms you may need to be medically evaluated; please contact the cardiology office ([phone]) for further direction.     7. It is possible that you may experience arrhythmia/Atrial Fibrillation post ablation.  This is secondary to irritation and inflammation of the cardiac tissues from the ablation.  If you have atrial fibrillation all day or feel poorly with it, please notify your cardiologist's via phone ([phone]) or [employer].      8.  Please contact call our office ([phone]) or message via [employer] message if you have any questions or concerns post procedurally.    9. You need to be seen for post ablation follow up 3-4 weeks post procedure. An appointment is scheduled for you.  Please contact the office ([phone]) if you need to change your appointment.    DIET: To avoid nausea, slowly advance diet as tolerated, avoiding spicy or greasy foods for the first day.  Add more substantial food to your diet according to your physician's instructions.  Babies can be fed formula or breast milk as soon as they are hungry.  INCREASE FLUIDS AND FIBER TO AVOID CONSTIPATION.    SURGICAL DRESSING/BATHING:   See above.    MEDICATIONS: Resume taking daily medication.  Take prescribed pain medication with food.  If no medication is prescribed, you may take non-aspirin pain medication if needed.  PAIN MEDICATION CAN BE VERY CONSTIPATING.  Take a stool softener or laxative such as senokot, pericolace, or milk of magnesia if needed.    Prescription given for Prilosec.  No pain medication given in recovery.    A follow-up appointment should be arranged with your doctor in 3-4 weeks; call to schedule.    You should CALL YOUR PHYSICIAN if you develop:  Fever greater than 101 degrees F.  Pain not relieved  by medication, or persistent nausea or vomiting.  Excessive bleeding (blood soaking through dressing) or unexpected drainage from the wound.  Extreme redness or swelling around the incision site, drainage of pus or foul smelling drainage.  Inability to urinate or empty your bladder within 8 hours.  Problems with breathing or chest pain.    You should call 911 if you develop problems with breathing or chest pain.  If you are unable to contact your doctor or surgical center, you should go to the nearest emergency room or urgent care center.  Physician's telephone #: Dr. Ott [phone]    MILD FLU-LIKE SYMPTOMS ARE NORMAL.  YOU MAY EXPERIENCE GENERALIZED MUSCLE ACHES, THROAT IRRITATION, HEADACHE AND/OR SOME NAUSEA.    If any questions arise, call your doctor.  If your doctor is not available, please feel free to call the Surgical Center at [phone].  The Center is open Monday through Friday from 7AM to 7PM.      A registered nurse may call you a few days after your surgery to see how you are doing after your procedure.    You may also receive a survey in the mail within the next two weeks and we ask that you take a few moments to complete the survey and return it to us.  Our goal is to provide you with very good care and we value your comments.     Depression / Suicide Risk    As you are discharged from this [facility] facility, it is important to learn how to keep safe from harming yourself.    Recognize the warning signs:  Abrupt changes in personality, positive or negative- including increase in energy   Giving away possessions  Change in eating patterns- significant weight changes-  positive or negative  Change in sleeping patterns- unable to sleep or sleeping all the time   Unwillingness or inability to communicate  Depression  Unusual sadness, discouragement and loneliness  Talk of wanting to die  Neglect of personal appearance   Rebelliousness- reckless behavior  Withdrawal from people/activities  they love  Confusion- inability to concentrate     If you or a loved one observes any of these behaviors or has concerns about self-harm, here's what you can do:  Talk about it- your feelings and reasons for harming yourself  Remove any means that you might use to hurt yourself (examples: pills, rope, extension cords, firearm)  Get professional help from the community (Mental Health, Substance Abuse, psychological counseling)  Do not be alone:Call your Safe Contact- someone whom you trust who will be there for you.  Call your local CRISIS HOTLINE 899-1865 or 023-097-3232  Call your local Children's Mobile Crisis Response Team [place] (373) 067-2726 or www.[employer]  Call the toll free National Suicide Prevention Hotlines   National Suicide Prevention Lifeline 246-623-LIVK (5967)  National Hope Line Network 800-SUICIDE (191-3107)    I acknowledge receipt and understanding of these Home Care instructions.

## 2023-08-11 NOTE — ANESTHESIA POSTPROCEDURE EVALUATION
Patient: Myke Bustos    Procedure Summary     Date: 08/11/23 Room / Location: [facility] - Cath Lab [facility]    Anesthesia Start: 1120 Anesthesia Stop: 1410    Procedure: CL-EP ABLATION ATRIAL FIBRILLATION Diagnosis:       Atrial fibrillation, persistent (HCC)      Other persistent atrial fibrillation      (See Associated Dx)    Scheduled Providers: Zeb Ott M.D.; Mauricio Yusuf M.D. Responsible Provider: Mauricio Yusuf M.D.    Anesthesia Type: general ASA Status: 2          Final Anesthesia Type: general  Last vitals  BP   Blood Pressure : 123/81    Temp   36.3 °C (97.4 °F)    Pulse   65   Resp   16    SpO2   93 %      Anesthesia Post Evaluation    Patient location during evaluation: PACU  Patient participation: complete - patient participated  Level of consciousness: awake and alert    Airway patency: patent  Anesthetic complications: no  Cardiovascular status: hemodynamically stable  Respiratory status: acceptable  Hydration status: euvolemic    PONV: none          No notable events documented.

## 2023-08-11 NOTE — OR NURSING
Two hour bedrest completed, patient ambulated 15 ft, no assistance required, bilateral groin sites remain clean/dry/soft after ambulation. Patient transferred to phase two, patient's wife updated.

## 2023-08-11 NOTE — OP REPORT
Electrophysiology Procedure Note  [facility]    Procedures Performed:  Pulmonary Vein Isolation  Additional ablation for atrial fibrillation x2  Ablation of additional arrhythmia x2  Intracardiac Echocardiography  Three-dimensional intracardiac mapping  IV isoproterenol infusion with programmed stimulation  Trans-esophageal echocardiogram    Electrophysiologist: Zeb Ott MD    Assistant(s): None    Anesthesia: General anesthesia was provided by Dr. Yusuf of the Anesthesiology service.    Statement of Medical Necessity: This is a 68  year-old male with history of symptomatic persistent atrial  fibrillation     Pre-procedure ECG: AF    Post-procedure ECG: Sinus     Description of Procedure:    Access and catheter placement: After obtaining informed written consent, the patient was  brought to the EP lab in the fasting, non-sedated stated. The patient was sedated and intubated  by the anesthesiologist. The patient was prepped and draped in the usual sterile fashion. Using  the modified Seldinger technique, access was obtained in bilateral  femoral veins. Guidewires were advanced into the IVC. In the right femoral vein, 2 sheaths of   8F were placed. In the left femoral vein 2 sheaths of 8F were placed. A deflectable  decapolar catheter was advanced through the LFV to the coronary sinus. An 8F intracardiac  echo catheter was advanced to the right atrium. Through one of the 8F short sheaths in the RFV,  a long wire was advanced to the SVC. The short sheath was  changed out for an 8.5 F braided Tor-flex (Chomp) sheath which was advanced to the SVC. The wire was  removed and the dilator was flushed. A 71-cm transseptal needle (Chomp) was advanced to the tip of the  dilator, and the obturator was removed. The transseptal needle was attached to the manifold and  flushed. Under intracardiac echocardiographic guidance, the sheath/needle   system was withdrawn to the fossa ovalis. At this time, 17,000  units of heparin were administered.   Additional boluses of heparin were given as needed to keep -350 sec during LA dwell time.   The needle was advanced out of the dilator, and RF energy applied via the [place] needle.   ICE visualized the needle passage through the fossa ovalis into the left  atrium. Confirmation of left atrial location was confirmed by injecting saline and transducing  pressure. The dilator was advanced over the needle into the left atrium, and then the sheath was advanced over the dilator. The dilator and  needle were removed. The sheath was flushed and connected to a continuous heparinized   saline drip.  The transseptal catheterization  procedure was repeated through the second RFV access site a 98-cm transseptal needle  and a medium-curl Vizigo (DangDang.comter) sheath. Left atrial location was again confirmed by injecting saline  and transducing pressure. The needle and dilator were removed. The sheath was attached to  the drip line and flushed.     A 3.5-mm externally-irrigated ablation catheter (Biosense-Bentley   Thermocool ST SF DF-curve) was advanced through the Vizigo sheath into the left atrium. A duodecapolar Penta-Ray catheter (Biosense-Bentley) was  advanced through the Tor-flex sheath into the left atrium. A 3-dimensional electroanatomical  map was constructed using the CARTO system.    Attention was first turned to the left pulmonary veins. The Penta-Ray  catheter was placed in the LSPV and LIPV which showed conduction into the veins. The catheters were then moved to the RSPV and RIPV which showed evidence of conduction into the veins. The posterior wall was evaluated as an additional target for treatment of atrial fibrillation. Ablation was performed along the anterior aspects of the LPVs and RPVs, with additional roof and floor lines, 20-40W, resulting in isolation of the LPVs, RPVs, and posterior wall.  Power was reduced near the esophagus.  The Penta-Ray was advanced  into all four pulmonary veins and persistent conduction block into the right and left pulmonary veins in addition to posterior wall was demonstrated.     For additional treatment of atrial fibrillation we tareted the VOM for ethanol ablation. This was performed through a 2.0 x 6.0mm occlusive balloon, with 2 aliquots of 5cc of 99% ethanol. Post-injection contrast revealed excellent staining.    We targeted the lateral mitral isthmus for ablation to create a line of block to treat and prevent mitral flutter. This was technically quite challenging. Endocardial ablation at 45W was performed broadly but did not result in block. Ablation at 30W in the lateral CS did result in block but conduction would resume. Further ablation in the CS would repeatedly result in restoration of block but conduction kept returning. We did ablate with higher wattage, 35W, but conduction still recurred. Ablation at 30W for longer duration (45s) was performed which appeared to result in durable block.     The patient remained in atrial fibrillation so a single 200J external shock was applied, restoring sinus rhythm.   Isuprel 2-4mcg/min was administered and burst pacing was performed in the left atrium. Typical flutter was inducible, TCL 310ms. The left atrial sheaths and catheters were withdrawn to the right atrium.  Due to 1:1 conduction and hemodynamic instability he was quickly cardioverted to NSR with 100J. We performed CTI ablation at 35-40W to create a line of block. This was also technically challenging and block was difficult to obtain but ablation further laterally was performed at site of breakout resulting in block >230ms.      ICE visualization   of the pericardium confirmed no pericardial effusion at the end of the case. The  patient was awakened from anesthesia, catheters and sheaths were removed, Vascade MVP closure devices were deployed, and manual  pressure was held on bilateral groins until hemostasis was  achieved.    Electrophysiological Findings:  Sinus cycle length 813 msec  Intervals  H-V 42 msec   msec   msec   msec    Total RF time: 2229 sec  Fluoro time: 7.3 min    Estimated blood loss - 30 mL    Complications: None    Impression:  1. Atrial fibrillation, persistent  2. Successful isolation of all four pulmonary veins  3. Successful posterior wall isolation and VOM ethanol ablation for additional ablation of atrial fibrillation x2.  4. Typical and atypical flutter, with successful CTI and lateral mitral isthmus ablation. Difficult ablation lines.    Recommendations:  1. Bed rest for 2 hours  2. Telemetry monitoring during recovery  3. Anticoagulant therapy for at least 6-12 months  4. Follow up in Arrhythmia clinic in 4 weeks  5. Consider anterior mitral line if recurrent atypical flutter.      Zeb Ott MD  Cardiac Electrophysiology

## 2023-08-12 NOTE — OR NURSING
Arrived from PACU   Pt is A&O x4,  VSS; denies N/V; states pain is at tolerable level. Surgical dressing CDI to bilat groins and right wrist - gauze and tegaderm; no active bleeding or hematoma noted.     Bedrest completed at 1810. Pt able to void in the urinal  D/c orders received. IV dc'd.   Pt changed into clothing with assistance.   Discharge reviewed  Pt and family verbalized understanding and questions answered.   Patient states ready to d/c home.   Pt dc'd in w/c with spouse.

## 2023-08-12 NOTE — ANESTHESIA POSTPROCEDURE EVALUATION
Patient: Myke Bustos    Procedure Summary     Date: 08/11/23 Room / Location: [facility] - Cath Lab [facility]    Anesthesia Start: 1120 Anesthesia Stop: 1410    Procedure: CL-EP ABLATION ATRIAL FIBRILLATION Diagnosis:       Atrial fibrillation, persistent (HCC)      Other persistent atrial fibrillation      (See Associated Dx)    Scheduled Providers: Zeb Ott M.D.; Mauricio Yusuf M.D. Responsible Provider: Mauricio Yusuf M.D.    Anesthesia Type: general ASA Status: 2          Final Anesthesia Type: general  Last vitals  BP   Blood Pressure : 123/80    Temp   (!) 35.8 °C (96.4 °F)    Pulse   75   Resp   18    SpO2   96 %      Anesthesia Post Evaluation    Patient location during evaluation: PACU  Patient participation: complete - patient participated  Level of consciousness: awake and alert    Airway patency: patent  Anesthetic complications: no  Cardiovascular status: hemodynamically stable  Respiratory status: acceptable  Hydration status: euvolemic    PONV: none          No notable events documented.     Nurse Pain Score: 0 (NPRS)

## 2023-08-18 ENCOUNTER — TELEPHONE (OUTPATIENT)
Dept: CARDIOLOGY | Facility: MEDICAL CENTER | Age: 68
End: 2023-08-18
Payer: MEDICARE

## 2023-08-18 DIAGNOSIS — E03.9 HYPOTHYROIDISM, UNSPECIFIED TYPE: ICD-10-CM

## 2023-08-18 NOTE — TELEPHONE ENCOUNTER
Phone Number Called: [phone]    Call outcome: Spoke to patient regarding message below.    Message: Called to discuss patients concerns of going back into Afib.   No missed doses of current medications.   Denies triggers  Denies symptoms, feels good.  Patient has no concerns. Denies coming into office for EKG.   Ablation on 8/11/23.   ER precautions discussed.

## 2023-08-18 NOTE — TELEPHONE ENCOUNTER
Caller: Bernard Bustos     Topic/issue: Patient had ablation about a week and a half ago and wants the office to know he went into AFIB this afternoon.     Callback Number: [phone]    Thank You,   Yolande MACHADO

## 2023-08-19 NOTE — TELEPHONE ENCOUNTER
Zeb Ott M.D.  You; AALIYAH Goncalves 1 minute ago (5:07 PM)       No, continue all medications, if still in afib at time of next appt with TALYA let's arrange DCCV

## 2023-08-21 RX ORDER — LEVOTHYROXINE SODIUM 0.15 MG/1
TABLET ORAL
Qty: 90 TABLET | Refills: 0 | OUTPATIENT
Start: 2023-08-21

## 2023-08-21 NOTE — TELEPHONE ENCOUNTER
"S/w patient regarding [facility] recommendations, \"No, continue all medications, if still in afib at time of next appt with JS let's arrange DCCV \" Patient verbalized understanding    "

## 2023-08-31 ENCOUNTER — OFFICE VISIT (OUTPATIENT)
Dept: CARDIOLOGY | Facility: MEDICAL CENTER | Age: 68
End: 2023-08-31
Attending: NURSE PRACTITIONER
Payer: MEDICARE

## 2023-08-31 ENCOUNTER — TELEPHONE (OUTPATIENT)
Dept: CARDIOLOGY | Facility: MEDICAL CENTER | Age: 68
End: 2023-08-31
Payer: MEDICARE

## 2023-08-31 VITALS
DIASTOLIC BLOOD PRESSURE: 76 MMHG | HEART RATE: 98 BPM | WEIGHT: 264 LBS | OXYGEN SATURATION: 95 % | RESPIRATION RATE: 14 BRPM | BODY MASS INDEX: 34.99 KG/M2 | SYSTOLIC BLOOD PRESSURE: 120 MMHG | HEIGHT: 73 IN

## 2023-08-31 DIAGNOSIS — Z86.79 S/P ABLATION OF ATRIAL FIBRILLATION: ICD-10-CM

## 2023-08-31 DIAGNOSIS — Z79.899 ENCOUNTER FOR MONITORING AMIODARONE THERAPY: Primary | ICD-10-CM

## 2023-08-31 DIAGNOSIS — I10 ESSENTIAL HYPERTENSION, BENIGN: ICD-10-CM

## 2023-08-31 DIAGNOSIS — D68.69 HYPERCOAGULABLE STATE DUE TO PAROXYSMAL ATRIAL FIBRILLATION (HCC): ICD-10-CM

## 2023-08-31 DIAGNOSIS — Z51.81 ENCOUNTER FOR MONITORING AMIODARONE THERAPY: Primary | ICD-10-CM

## 2023-08-31 DIAGNOSIS — I48.0 PAF (PAROXYSMAL ATRIAL FIBRILLATION) (HCC): ICD-10-CM

## 2023-08-31 DIAGNOSIS — Z98.890 S/P ABLATION OF ATRIAL FIBRILLATION: ICD-10-CM

## 2023-08-31 DIAGNOSIS — I48.0 HYPERCOAGULABLE STATE DUE TO PAROXYSMAL ATRIAL FIBRILLATION (HCC): ICD-10-CM

## 2023-08-31 LAB — EKG IMPRESSION: NORMAL

## 2023-08-31 PROCEDURE — 99215 OFFICE O/P EST HI 40 MIN: CPT | Performed by: NURSE PRACTITIONER

## 2023-08-31 PROCEDURE — 99212 OFFICE O/P EST SF 10 MIN: CPT | Performed by: NURSE PRACTITIONER

## 2023-08-31 PROCEDURE — 93010 ELECTROCARDIOGRAM REPORT: CPT | Performed by: INTERNAL MEDICINE

## 2023-08-31 PROCEDURE — 93005 ELECTROCARDIOGRAM TRACING: CPT | Performed by: NURSE PRACTITIONER

## 2023-08-31 PROCEDURE — 3078F DIAST BP <80 MM HG: CPT | Performed by: NURSE PRACTITIONER

## 2023-08-31 PROCEDURE — 3074F SYST BP LT 130 MM HG: CPT | Performed by: NURSE PRACTITIONER

## 2023-08-31 RX ORDER — METOPROLOL SUCCINATE 25 MG/1
50 TABLET, EXTENDED RELEASE ORAL 2 TIMES DAILY
Qty: 180 TABLET | Refills: 3 | Status: SHIPPED | OUTPATIENT
Start: 2023-08-31 | End: 2024-02-01 | Stop reason: SDUPTHER

## 2023-08-31 RX ORDER — AMIODARONE HYDROCHLORIDE 200 MG/1
200 TABLET ORAL DAILY
Qty: 120 TABLET | Refills: 3 | Status: ON HOLD | OUTPATIENT
Start: 2023-08-31 | End: 2023-09-29

## 2023-08-31 ASSESSMENT — ENCOUNTER SYMPTOMS
ORTHOPNEA: 0
PALPITATIONS: 1
NAUSEA: 0
WHEEZING: 0
HALLUCINATIONS: 0
DIZZINESS: 0
CLAUDICATION: 0
MUSCULOSKELETAL NEGATIVE: 1
DEPRESSION: 0
GASTROINTESTINAL NEGATIVE: 1
BRUISES/BLEEDS EASILY: 0
NERVOUS/ANXIOUS: 0
SENSORY CHANGE: 0
PND: 0
SHORTNESS OF BREATH: 0
EYES NEGATIVE: 1
NEUROLOGICAL NEGATIVE: 1
RESPIRATORY NEGATIVE: 1
CONSTITUTIONAL NEGATIVE: 1

## 2023-08-31 ASSESSMENT — FIBROSIS 4 INDEX: FIB4 SCORE: 2.16

## 2023-08-31 NOTE — TELEPHONE ENCOUNTER
Seth,    For this cardioversion, anesthesia? Just want yo give you a headsup that we are booking into end of September beginning of October for anesthesia,

## 2023-08-31 NOTE — TELEPHONE ENCOUNTER
Patient is scheduled for a Cardioversion with anesthesia on 09- with Dr. Garcia. Patient has been instructed to check in at 8:00 am for 10:00 procedure. No meds to hold. Message sent to authorizations. Sent [employer] message to pt with instructions.  No device. FYI sent to Seth Espinoza and Radha.

## 2023-08-31 NOTE — PATIENT INSTRUCTIONS
Amiodarone 400mg (2 tabs twice a day- 4 total) twice a day for 5 days and then 200 twice a day for two weeks then 200mg once a day    Stop digoxin     2 weeks EKG in office   - possibility of cardioversion if not back in normal rhythm at the EKG    Increase metoprolol to 50 twice a day     Lab work

## 2023-08-31 NOTE — PROGRESS NOTES
Atrial Fibrillation Follow up Note    DOS: 8/31/2023  [id number]  Myke Bustos    Chief complaint/Reason for consult: post op ablation    HPI: Pt is a 68 y.o. male who presents to the clinic today in follow up for post op ablation. Patient has a past medical history significant for but not limited to: sleep apnea, hypertension, secondary hypercoagulability, hypothyroidism, persistent atrial fibrillation, hyperlipidemia. Patient underwent ablation approximately 3 weeks ago with targeted sites of isolation all four pulmonary veins, posterior wall via roof and floor lines and debulking, vein of maris ethanol, lateral mitral isthmus line with additional CS ablation sites, and cavo-tricuspid isthmus line. Bernardo lateral isthmus block was challenging with adiditonal ablative sites in CS to achieve durable block. Bernardo has felt good post ablaiton. He is in atrial fibrillation today on EKG and says it ahs been a few days per his wifes Apple Watch that he may have been in it. Bernardo was stopped on sotalol at one point and therfore has no antiarhrythmic medicaiotn on. We discuss and elect to load with amiodaonre and repeat EKG in 2 weeks with planned cardioverison after that if still in AF to give bernardo best chance at helaing duroing the next 9 weeks. Long term goal will be to not need the amiodaonre longer than healing window and then titrate off. Discussed procedure and next steps to restore sinus rhythm. Patient and his wife are in agreement.       Past Medical History:   Diagnosis Date    Atrial fibrillation (HCC) 12/29/2011    Chickenpox     Essential hypertension, benign 07/24/2023    states controlled on meds    Hemorrhagic disorder (HCC)     on eliquis    High cholesterol     Long term (current) use of anticoagulants 12/29/2011    Obesity 12/29/2011    Other general symptoms(780.99)     Pure hypercholesterolemia 12/29/2011    Sleep apnea 12/29/2011    cpap    Unspecified hypothyroidism 12/29/2011        Past Surgical History:   Procedure Laterality Date    APPENDECTOMY      1994       Social History     Socioeconomic History    Marital status:      Spouse name: Not on file    Number of children: Not on file    Years of education: Not on file    Highest education level: Not on file   Occupational History    Not on file   Tobacco Use    Smoking status: Never    Smokeless tobacco: Never   Vaping Use    Vaping Use: Never used   Substance and Sexual Activity    Alcohol use: No    Drug use: Not Currently    Sexual activity: Not on file   Other Topics Concern    Not on file   Social History Narrative    Not on file     Social Determinants of Health     Financial Resource Strain: Not on file   Food Insecurity: Not on file   Transportation Needs: Not on file   Physical Activity: Not on file   Stress: Not on file   Social Connections: Not on file   Intimate Partner Violence: Not on file   Housing Stability: Not on file       Family History   Problem Relation Age of Onset    Heart Disease Mother         ACUTE MI IN HER 60s.    Heart Disease Father         ACUTE MI IN HIS 50s.       No Known Allergies    Current Outpatient Medications   Medication Sig Dispense Refill    amiodarone (CORDARONE) 200 MG Tab Take 1 Tablet by mouth every day. 400mg twice a day  for 5 days and then 200mg twice a day for 2 weeks then 200mg daily after that 120 Tablet 3    metoprolol SR (TOPROL XL) 25 MG TABLET SR 24 HR Take 2 Tablets by mouth 2 times a day. 180 Tablet 3    omeprazole (PRILOSEC) 20 MG delayed-release capsule Take 1 Capsule by mouth every day. Take every morning for 30 days post ablation to protect your esophagus. 30 Capsule 0    ELIQUIS 5 MG Tab TAKE 1 TABLET TWICE A  Tablet 0    levothyroxine (SYNTHROID) 150 MCG Tab TAKE 1 TABLET EVERY MORNINGON AN EMPTY STOMACH 90 Tablet 0    levothyroxine (SYNTHROID) 150 MCG Tab Take 1 Tablet by mouth every morning on an empty stomach. 90 Tablet 0    simvastatin (ZOCOR) 10 MG Tab  Take 1 Tablet by mouth every evening. 90 Tablet 0    valsartan (DIOVAN) 160 MG Tab Take 1 Tablet by mouth 2 times a day. 180 Tablet 0    ANDROGEL PUMP 20.25 MG/ACT (1.62%) Gel Pt uses occasional  0     No current facility-administered medications for this visit.       Vitals:    08/31/23 1055   BP: 120/76   Pulse: 98   Resp: 14   SpO2: 95%         Review of Systems   Constitutional: Negative.  Negative for malaise/fatigue.   HENT: Negative.     Eyes: Negative.    Respiratory: Negative.  Negative for shortness of breath and wheezing.    Cardiovascular:  Positive for palpitations. Negative for chest pain, orthopnea, claudication, leg swelling and PND.   Gastrointestinal: Negative.  Negative for nausea.   Genitourinary: Negative.    Musculoskeletal: Negative.    Skin: Negative.    Neurological: Negative.  Negative for dizziness and sensory change.   Endo/Heme/Allergies: Negative.  Does not bruise/bleed easily.   Psychiatric/Behavioral:  Negative for depression and hallucinations. The patient is not nervous/anxious.         EKG interpreted by me: AV paced    Physical Exam  Constitutional:       Appearance: Normal appearance.   HENT:      Head: Normocephalic.   Eyes:      Pupils: Pupils are equal, round, and reactive to light.   Neck:      Vascular: No JVD.   Cardiovascular:      Rate and Rhythm: Normal rate. Rhythm irregular.      Pulses: Normal pulses.      Heart sounds: Normal heart sounds.   Pulmonary:      Effort: Pulmonary effort is normal.      Breath sounds: Normal breath sounds.   Abdominal:      General: Abdomen is flat.      Palpations: Abdomen is soft.   Musculoskeletal:      Cervical back: Normal range of motion.      Right lower leg: No edema.      Left lower leg: No edema.   Skin:     General: Skin is warm and dry.   Neurological:      Mental Status: He is alert and oriented to person, place, and time.   Psychiatric:         Mood and Affect: Mood normal.         Behavior: Behavior normal.       "    Data:  Lipids:   No results found for: \"CHOLSTRLTOT\", \"TRIGLYCERIDE\", \"HDL\", \"LDL\"     BMP:  Lab Results   Component Value Date/Time    SODIUM 138 08/11/2023 0829    POTASSIUM 4.7 08/11/2023 0829    CHLORIDE 107 08/11/2023 0829    CO2 20 08/11/2023 0829    GLUCOSE 102 (H) 08/11/2023 0829    BUN 26 (H) 08/11/2023 0829    CREATININE 0.85 08/11/2023 0829    CALCIUM 9.0 08/11/2023 0829    ANION 11.0 08/11/2023 0829       GFR:  Lab Results   Component Value Date/Time    IFAFRICA >60 12/17/2016 0140    IFNOTAFR >60 12/17/2016 0140        TSH:   No results found for: \"TSHULTRASEN\"    MAGNESIUM:  Lab Results   Component Value Date/Time    MAGNESIUM 1.8 12/17/2016 0140    MAGNESIUM 1.8 12/16/2016 0214    MAGNESIUM 1.8 12/15/2016 0600        THYROXINE (T4):   No results found for: \"FREEDIR\"     CBC:   Lab Results   Component Value Date/Time    WBC 6.7 08/07/2023 08:04 AM    RBC 5.42 08/07/2023 08:04 AM    HEMOGLOBIN 16.8 08/07/2023 08:04 AM    HEMATOCRIT 50.8 08/07/2023 08:04 AM    MCV 93.7 08/07/2023 08:04 AM    MCH 31.0 08/07/2023 08:04 AM    MCHC 33.1 08/07/2023 08:04 AM    RDW 49.5 08/07/2023 08:04 AM    PLATELETCT 163 (L) 08/07/2023 08:04 AM    MPV 11.4 08/07/2023 08:04 AM    NEUTSPOLYS 60.60 08/07/2023 08:04 AM    LYMPHOCYTES 27.60 08/07/2023 08:04 AM    MONOCYTES 8.40 08/07/2023 08:04 AM    EOSINOPHILS 2.40 08/07/2023 08:04 AM    BASOPHILS 0.60 08/07/2023 08:04 AM    IMMGRAN 0.40 08/07/2023 08:04 AM    NRBC 0.00 08/07/2023 08:04 AM    NEUTS 4.06 08/07/2023 08:04 AM    LYMPHS 1.85 08/07/2023 08:04 AM    MONOS 0.56 08/07/2023 08:04 AM    EOS 0.16 08/07/2023 08:04 AM    BASO 0.04 08/07/2023 08:04 AM    IMMGRANAB 0.03 08/07/2023 08:04 AM    NRBCAB 0.00 08/07/2023 08:04 AM        CBC w/o DIFF  Lab Results   Component Value Date/Time    WBC 6.7 08/07/2023 08:04 AM    RBC 5.42 08/07/2023 08:04 AM    HEMOGLOBIN 16.8 08/07/2023 08:04 AM    MCV 93.7 08/07/2023 08:04 AM    MCH 31.0 08/07/2023 08:04 AM    MCHC 33.1 08/07/2023 " "08:04 AM    RDW 49.5 08/07/2023 08:04 AM    MPV 11.4 08/07/2023 08:04 AM       LIVER:  Lab Results   Component Value Date/Time    ALKPHOSPHAT 71 08/11/2023 08:29 AM    ASTSGOT 25 08/11/2023 08:29 AM    ALTSGPT 22 08/11/2023 08:29 AM    TBILIRUBIN 0.7 08/11/2023 08:29 AM       BNP:  No results found for: \"BNPBTYPENAT\"    PT/INR:  Lab Results   Component Value Date/Time    PROTHROMBTM 15.5 (H) 08/11/2023 08:29 AM    PROTHROMBTM 16.0 (H) 08/07/2023 08:04 AM    PROTHROMBTM 14.5 12/14/2016 04:17 PM    INR 1.24 (H) 08/11/2023 08:29 AM    INR 1.30 (H) 08/07/2023 08:04 AM    INR 1.09 12/14/2016 04:17 PM             Impression/Plan:  1. Encounter for monitoring amiodarone therapy  Comp Metabolic Panel    TSH+FREE T4      2. PAF (paroxysmal atrial fibrillation) (HCC)  EKG    amiodarone (CORDARONE) 200 MG Tab    CL-CARDIOVERSION    EKG      3. Essential hypertension, benign  metoprolol SR (TOPROL XL) 25 MG TABLET SR 24 HR      4. Hypercoagulable state due to paroxysmal atrial fibrillation (HCC)        5. S/P ablation of atrial fibrillation      2023- isolation all four pulmonary veins, posterior wall via roof and floor lines and debulking, vein of maris ethanol, lateral mitral isthmus line with suman       - amiodarone load 400mg twice daily for 5 days followed by 200mg twice daily for two weeks then 200mg daily   - repeat in office EKG in two weeks   - if still in AF cardioversion within one week after    - long term goal to discontinue within 6 months   - blood pressure well controlled today   - Continue Eliquis 5mg twice daily for stroke protection   - stop digoxin due to interaction with amiodarone   - increase metoprolol to 50 twice daily   - continue current valsartan dose   - discussed healing and long term expectations   - CMP and TSH in the next week      A total of 40 minutes of time was spent on day of encounter reviewing medical record, performing history and examination, counseling, ordering " medication/test/consults, collaborating with referring service, and documentation.    Seth Espinoza AGACNP-EP  Cardiac Electrophysiology

## 2023-09-06 ENCOUNTER — HOSPITAL ENCOUNTER (OUTPATIENT)
Dept: LAB | Facility: MEDICAL CENTER | Age: 68
End: 2023-09-06
Attending: NURSE PRACTITIONER
Payer: MEDICARE

## 2023-09-06 DIAGNOSIS — Z51.81 ENCOUNTER FOR MONITORING AMIODARONE THERAPY: ICD-10-CM

## 2023-09-06 DIAGNOSIS — Z79.899 ENCOUNTER FOR MONITORING AMIODARONE THERAPY: ICD-10-CM

## 2023-09-06 PROCEDURE — 84443 ASSAY THYROID STIM HORMONE: CPT

## 2023-09-06 PROCEDURE — 80053 COMPREHEN METABOLIC PANEL: CPT

## 2023-09-06 PROCEDURE — 36415 COLL VENOUS BLD VENIPUNCTURE: CPT

## 2023-09-06 PROCEDURE — 84439 ASSAY OF FREE THYROXINE: CPT

## 2023-09-07 ENCOUNTER — APPOINTMENT (OUTPATIENT)
Dept: ADMISSIONS | Facility: MEDICAL CENTER | Age: 68
End: 2023-09-07
Attending: INTERNAL MEDICINE
Payer: MEDICARE

## 2023-09-07 LAB
ALBUMIN SERPL BCP-MCNC: 4.1 G/DL (ref 3.2–4.9)
ALBUMIN/GLOB SERPL: 1.2 G/DL
ALP SERPL-CCNC: 78 U/L (ref 30–99)
ALT SERPL-CCNC: 27 U/L (ref 2–50)
ANION GAP SERPL CALC-SCNC: 13 MMOL/L (ref 7–16)
AST SERPL-CCNC: 37 U/L (ref 12–45)
BILIRUB SERPL-MCNC: 1.1 MG/DL (ref 0.1–1.5)
BUN SERPL-MCNC: 22 MG/DL (ref 8–22)
CALCIUM ALBUM COR SERPL-MCNC: 9.4 MG/DL (ref 8.5–10.5)
CALCIUM SERPL-MCNC: 9.5 MG/DL (ref 8.5–10.5)
CHLORIDE SERPL-SCNC: 104 MMOL/L (ref 96–112)
CO2 SERPL-SCNC: 21 MMOL/L (ref 20–33)
CREAT SERPL-MCNC: 1.03 MG/DL (ref 0.5–1.4)
GFR SERPLBLD CREATININE-BSD FMLA CKD-EPI: 80 ML/MIN/1.73 M 2
GLOBULIN SER CALC-MCNC: 3.3 G/DL (ref 1.9–3.5)
GLUCOSE SERPL-MCNC: 83 MG/DL (ref 65–99)
POTASSIUM SERPL-SCNC: 4.4 MMOL/L (ref 3.6–5.5)
PROT SERPL-MCNC: 7.4 G/DL (ref 6–8.2)
SODIUM SERPL-SCNC: 138 MMOL/L (ref 135–145)
T4 FREE SERPL-MCNC: 1.36 NG/DL (ref 0.93–1.7)
TSH SERPL DL<=0.005 MIU/L-ACNC: 6.38 UIU/ML (ref 0.38–5.33)

## 2023-09-14 ENCOUNTER — PRE-ADMISSION TESTING (OUTPATIENT)
Dept: ADMISSIONS | Facility: MEDICAL CENTER | Age: 68
End: 2023-09-14
Attending: INTERNAL MEDICINE
Payer: MEDICARE

## 2023-09-14 DIAGNOSIS — Z01.812 PRE-OPERATIVE LABORATORY EXAMINATION: ICD-10-CM

## 2023-09-15 ENCOUNTER — NON-PROVIDER VISIT (OUTPATIENT)
Dept: CARDIOLOGY | Facility: MEDICAL CENTER | Age: 68
End: 2023-09-15
Attending: NURSE PRACTITIONER
Payer: MEDICARE

## 2023-09-15 DIAGNOSIS — I48.0 PAF (PAROXYSMAL ATRIAL FIBRILLATION) (HCC): ICD-10-CM

## 2023-09-15 PROCEDURE — 93005 ELECTROCARDIOGRAM TRACING: CPT

## 2023-09-15 NOTE — PROGRESS NOTES
Patient was here today for EKG per MT.     Patient reports no active symptoms at this time. He did have a cardioversion Friday 9/8/2023 and believes he's been in sinus rhythm since. He also reports taking all medications as prescribed.     EKG performed and transferred into patients chart. Paper copy of EKG given to Namrata CONN for MT. Patient has been informed that Namrata CONN, will call on Monday if there are any concerns or recommendations about the EKG.

## 2023-09-19 LAB — EKG IMPRESSION: NORMAL

## 2023-09-19 PROCEDURE — 93010 ELECTROCARDIOGRAM REPORT: CPT | Performed by: INTERNAL MEDICINE

## 2023-09-27 ENCOUNTER — PRE-ADMISSION TESTING (OUTPATIENT)
Dept: ADMISSIONS | Facility: MEDICAL CENTER | Age: 68
End: 2023-09-27
Attending: INTERNAL MEDICINE
Payer: MEDICARE

## 2023-09-27 DIAGNOSIS — Z01.812 PRE-OPERATIVE LABORATORY EXAMINATION: ICD-10-CM

## 2023-09-27 LAB
ALBUMIN SERPL BCP-MCNC: 4.2 G/DL (ref 3.2–4.9)
ALBUMIN/GLOB SERPL: 1.5 G/DL
ALP SERPL-CCNC: 76 U/L (ref 30–99)
ALT SERPL-CCNC: 26 U/L (ref 2–50)
ANION GAP SERPL CALC-SCNC: 11 MMOL/L (ref 7–16)
AST SERPL-CCNC: 24 U/L (ref 12–45)
BILIRUB SERPL-MCNC: 0.9 MG/DL (ref 0.1–1.5)
BUN SERPL-MCNC: 22 MG/DL (ref 8–22)
CALCIUM ALBUM COR SERPL-MCNC: 9.4 MG/DL (ref 8.5–10.5)
CALCIUM SERPL-MCNC: 9.6 MG/DL (ref 8.5–10.5)
CHLORIDE SERPL-SCNC: 102 MMOL/L (ref 96–112)
CO2 SERPL-SCNC: 20 MMOL/L (ref 20–33)
CREAT SERPL-MCNC: 0.82 MG/DL (ref 0.5–1.4)
ERYTHROCYTE [DISTWIDTH] IN BLOOD BY AUTOMATED COUNT: 48.6 FL (ref 35.9–50)
GFR SERPLBLD CREATININE-BSD FMLA CKD-EPI: 97 ML/MIN/1.73 M 2
GLOBULIN SER CALC-MCNC: 2.8 G/DL (ref 1.9–3.5)
GLUCOSE SERPL-MCNC: 97 MG/DL (ref 65–99)
HCT VFR BLD AUTO: 45 % (ref 42–52)
HGB BLD-MCNC: 14.9 G/DL (ref 14–18)
INR PPP: 1.2 (ref 0.87–1.13)
MCH RBC QN AUTO: 31.9 PG (ref 27–33)
MCHC RBC AUTO-ENTMCNC: 33.1 G/DL (ref 32.3–36.5)
MCV RBC AUTO: 96.4 FL (ref 81.4–97.8)
PLATELET # BLD AUTO: 221 K/UL (ref 164–446)
PMV BLD AUTO: 9.7 FL (ref 9–12.9)
POTASSIUM SERPL-SCNC: 4.2 MMOL/L (ref 3.6–5.5)
PROT SERPL-MCNC: 7 G/DL (ref 6–8.2)
PROTHROMBIN TIME: 15.4 SEC (ref 12–14.6)
RBC # BLD AUTO: 4.67 M/UL (ref 4.7–6.1)
SODIUM SERPL-SCNC: 133 MMOL/L (ref 135–145)
WBC # BLD AUTO: 7.1 K/UL (ref 4.8–10.8)

## 2023-09-27 PROCEDURE — 85610 PROTHROMBIN TIME: CPT

## 2023-09-27 PROCEDURE — 80053 COMPREHEN METABOLIC PANEL: CPT

## 2023-09-27 PROCEDURE — 36415 COLL VENOUS BLD VENIPUNCTURE: CPT

## 2023-09-27 PROCEDURE — 85027 COMPLETE CBC AUTOMATED: CPT

## 2023-09-29 ENCOUNTER — APPOINTMENT (OUTPATIENT)
Dept: CARDIOLOGY | Facility: MEDICAL CENTER | Age: 68
End: 2023-09-29
Attending: NURSE PRACTITIONER
Payer: MEDICARE

## 2023-09-29 ENCOUNTER — HOSPITAL ENCOUNTER (OUTPATIENT)
Facility: MEDICAL CENTER | Age: 68
End: 2023-09-29
Attending: INTERNAL MEDICINE | Admitting: INTERNAL MEDICINE
Payer: MEDICARE

## 2023-09-29 VITALS
SYSTOLIC BLOOD PRESSURE: 134 MMHG | WEIGHT: 260.58 LBS | OXYGEN SATURATION: 97 % | DIASTOLIC BLOOD PRESSURE: 74 MMHG | HEIGHT: 73 IN | RESPIRATION RATE: 14 BRPM | TEMPERATURE: 97.8 F | BODY MASS INDEX: 34.54 KG/M2 | HEART RATE: 65 BPM

## 2023-09-29 DIAGNOSIS — I48.0 PAF (PAROXYSMAL ATRIAL FIBRILLATION) (HCC): ICD-10-CM

## 2023-09-29 LAB
EKG IMPRESSION: NORMAL
INR PPP: 1.2 (ref 0.87–1.13)
PROTHROMBIN TIME: 15.4 SEC (ref 12–14.6)

## 2023-09-29 PROCEDURE — 85610 PROTHROMBIN TIME: CPT

## 2023-09-29 PROCEDURE — 700105 HCHG RX REV CODE 258: Performed by: INTERNAL MEDICINE

## 2023-09-29 PROCEDURE — 93010 ELECTROCARDIOGRAM REPORT: CPT | Performed by: INTERNAL MEDICINE

## 2023-09-29 PROCEDURE — 93005 ELECTROCARDIOGRAM TRACING: CPT | Performed by: INTERNAL MEDICINE

## 2023-09-29 RX ORDER — METOPROLOL TARTRATE 1 MG/ML
1 INJECTION, SOLUTION INTRAVENOUS
Status: CANCELLED | OUTPATIENT
Start: 2023-09-29

## 2023-09-29 RX ORDER — EPHEDRINE SULFATE 50 MG/ML
5 INJECTION, SOLUTION INTRAVENOUS
Status: CANCELLED | OUTPATIENT
Start: 2023-09-29

## 2023-09-29 RX ORDER — SODIUM CHLORIDE, SODIUM LACTATE, POTASSIUM CHLORIDE, CALCIUM CHLORIDE 600; 310; 30; 20 MG/100ML; MG/100ML; MG/100ML; MG/100ML
INJECTION, SOLUTION INTRAVENOUS CONTINUOUS
Status: DISCONTINUED | OUTPATIENT
Start: 2023-09-29 | End: 2023-09-29 | Stop reason: HOSPADM

## 2023-09-29 RX ORDER — DIPHENHYDRAMINE HYDROCHLORIDE 50 MG/ML
12.5 INJECTION INTRAMUSCULAR; INTRAVENOUS
Status: CANCELLED | OUTPATIENT
Start: 2023-09-29

## 2023-09-29 RX ORDER — OXYCODONE HCL 5 MG/5 ML
5 SOLUTION, ORAL ORAL
Status: CANCELLED | OUTPATIENT
Start: 2023-09-29

## 2023-09-29 RX ORDER — SODIUM CHLORIDE, SODIUM LACTATE, POTASSIUM CHLORIDE, CALCIUM CHLORIDE 600; 310; 30; 20 MG/100ML; MG/100ML; MG/100ML; MG/100ML
INJECTION, SOLUTION INTRAVENOUS CONTINUOUS
Status: CANCELLED | OUTPATIENT
Start: 2023-09-29

## 2023-09-29 RX ORDER — OXYCODONE HCL 5 MG/5 ML
10 SOLUTION, ORAL ORAL
Status: CANCELLED | OUTPATIENT
Start: 2023-09-29

## 2023-09-29 RX ORDER — AMIODARONE HYDROCHLORIDE 200 MG/1
200 TABLET ORAL DAILY
COMMUNITY
End: 2023-11-27 | Stop reason: SDUPTHER

## 2023-09-29 RX ORDER — HYDROMORPHONE HYDROCHLORIDE 1 MG/ML
0.4 INJECTION, SOLUTION INTRAMUSCULAR; INTRAVENOUS; SUBCUTANEOUS
Status: CANCELLED | OUTPATIENT
Start: 2023-09-29

## 2023-09-29 RX ORDER — HALOPERIDOL 5 MG/ML
1 INJECTION INTRAMUSCULAR
Status: CANCELLED | OUTPATIENT
Start: 2023-09-29

## 2023-09-29 RX ORDER — MEPERIDINE HYDROCHLORIDE 25 MG/ML
6.25 INJECTION INTRAMUSCULAR; INTRAVENOUS; SUBCUTANEOUS
Status: CANCELLED | OUTPATIENT
Start: 2023-09-29

## 2023-09-29 RX ORDER — ACETAMINOPHEN 500 MG
1000 TABLET ORAL
Status: CANCELLED | OUTPATIENT
Start: 2023-09-29

## 2023-09-29 RX ORDER — LABETALOL HYDROCHLORIDE 5 MG/ML
5 INJECTION, SOLUTION INTRAVENOUS
Status: CANCELLED | OUTPATIENT
Start: 2023-09-29

## 2023-09-29 RX ORDER — HYDROMORPHONE HYDROCHLORIDE 1 MG/ML
0.2 INJECTION, SOLUTION INTRAMUSCULAR; INTRAVENOUS; SUBCUTANEOUS
Status: CANCELLED | OUTPATIENT
Start: 2023-09-29

## 2023-09-29 RX ORDER — ONDANSETRON 2 MG/ML
4 INJECTION INTRAMUSCULAR; INTRAVENOUS
Status: CANCELLED | OUTPATIENT
Start: 2023-09-29

## 2023-09-29 RX ORDER — HYDRALAZINE HYDROCHLORIDE 20 MG/ML
5 INJECTION INTRAMUSCULAR; INTRAVENOUS
Status: CANCELLED | OUTPATIENT
Start: 2023-09-29

## 2023-09-29 RX ORDER — HYDROMORPHONE HYDROCHLORIDE 1 MG/ML
0.1 INJECTION, SOLUTION INTRAMUSCULAR; INTRAVENOUS; SUBCUTANEOUS
Status: CANCELLED | OUTPATIENT
Start: 2023-09-29

## 2023-09-29 RX ADMIN — SODIUM CHLORIDE, POTASSIUM CHLORIDE, SODIUM LACTATE AND CALCIUM CHLORIDE: 600; 310; 30; 20 INJECTION, SOLUTION INTRAVENOUS at 07:56

## 2023-09-29 ASSESSMENT — FIBROSIS 4 INDEX: FIB4 SCORE: 1.41

## 2023-11-22 ENCOUNTER — APPOINTMENT (OUTPATIENT)
Dept: CARDIOLOGY | Facility: MEDICAL CENTER | Age: 68
End: 2023-11-22
Attending: INTERNAL MEDICINE
Payer: MEDICARE

## 2023-11-27 ENCOUNTER — TELEPHONE (OUTPATIENT)
Dept: CARDIOLOGY | Facility: MEDICAL CENTER | Age: 68
End: 2023-11-27
Payer: MEDICARE

## 2023-11-27 ENCOUNTER — PATIENT MESSAGE (OUTPATIENT)
Dept: CARDIOLOGY | Facility: MEDICAL CENTER | Age: 68
End: 2023-11-27
Payer: MEDICARE

## 2023-11-27 DIAGNOSIS — I48.0 PAF (PAROXYSMAL ATRIAL FIBRILLATION) (HCC): ICD-10-CM

## 2023-11-27 RX ORDER — AMIODARONE HYDROCHLORIDE 200 MG/1
200 TABLET ORAL DAILY
Qty: 90 TABLET | Refills: 2 | Status: SHIPPED | OUTPATIENT
Start: 2023-11-27 | End: 2023-11-28 | Stop reason: SDUPTHER

## 2023-11-27 NOTE — TELEPHONE ENCOUNTER
MT    Caller:  Bernard    Medication Name and Dosage:    miodarone (CORDARONE) 200 MG Tab [[id number]]     Medication amount left: 0    Preferred [facility]acy:   [facility]    Other questions (Topic): Please send short supply here.    Callback Number (Will only call for issues): [phone]    Thank you,   Lia NAIK

## 2023-11-28 RX ORDER — AMIODARONE HYDROCHLORIDE 200 MG/1
200 TABLET ORAL DAILY
Qty: 90 TABLET | Refills: 2 | Status: SHIPPED | OUTPATIENT
Start: 2023-11-28 | End: 2023-12-28

## 2023-12-18 ENCOUNTER — APPOINTMENT (OUTPATIENT)
Dept: CARDIOLOGY | Facility: MEDICAL CENTER | Age: 68
End: 2023-12-18
Attending: INTERNAL MEDICINE
Payer: MEDICARE

## 2023-12-28 ENCOUNTER — OFFICE VISIT (OUTPATIENT)
Dept: CARDIOLOGY | Facility: MEDICAL CENTER | Age: 68
End: 2023-12-28
Attending: INTERNAL MEDICINE
Payer: MEDICARE

## 2023-12-28 VITALS
DIASTOLIC BLOOD PRESSURE: 86 MMHG | BODY MASS INDEX: 37.11 KG/M2 | SYSTOLIC BLOOD PRESSURE: 130 MMHG | WEIGHT: 280 LBS | HEIGHT: 73 IN | HEART RATE: 69 BPM | OXYGEN SATURATION: 96 % | RESPIRATION RATE: 14 BRPM

## 2023-12-28 DIAGNOSIS — I48.19 ATRIAL FIBRILLATION, PERSISTENT (HCC): ICD-10-CM

## 2023-12-28 PROCEDURE — 99212 OFFICE O/P EST SF 10 MIN: CPT | Performed by: INTERNAL MEDICINE

## 2023-12-28 PROCEDURE — 3075F SYST BP GE 130 - 139MM HG: CPT | Performed by: INTERNAL MEDICINE

## 2023-12-28 PROCEDURE — 93005 ELECTROCARDIOGRAM TRACING: CPT | Performed by: INTERNAL MEDICINE

## 2023-12-28 PROCEDURE — 99215 OFFICE O/P EST HI 40 MIN: CPT | Performed by: INTERNAL MEDICINE

## 2023-12-28 PROCEDURE — 3079F DIAST BP 80-89 MM HG: CPT | Performed by: INTERNAL MEDICINE

## 2023-12-28 RX ORDER — SOTALOL HYDROCHLORIDE 120 MG/1
120 TABLET ORAL 2 TIMES DAILY
Qty: 60 TABLET | Refills: 11 | Status: SHIPPED | OUTPATIENT
Start: 2023-12-28 | End: 2023-12-28

## 2023-12-28 RX ORDER — SOTALOL HYDROCHLORIDE 120 MG/1
120 TABLET ORAL 2 TIMES DAILY
Qty: 180 TABLET | Refills: 3 | Status: SHIPPED | OUTPATIENT
Start: 2023-12-28 | End: 2024-01-30 | Stop reason: SDUPTHER

## 2023-12-28 ASSESSMENT — FIBROSIS 4 INDEX: FIB4 SCORE: 1.41

## 2023-12-28 NOTE — PROGRESS NOTES
Arrhythmia Clinic Note (Established patient)    DOS: 12/28/2023    Chief complaint/Reason for consult: Afib    Interval History: 69 y/o M with persistent afib despite sotalol, taken for AF ablation. Recurrent AF, placed on amiodarone with reversion to NSR. Feeling well.     ROS (+ highlighted in bold):  Constitutional: Fevers/chills/fatigue/weightloss  HEENT: Blurry vision/eye pain/sore throat/hearing loss  Respiratory: Shortness of breath/cough  Cardiovascular: Chest pain/palpitations/edema/orthopnea/syncope  GI: Nausea/vomitting/diarrhea  MSK: Arthralgias/myagias/muscle weakness  Skin: Rash/sores  Neurological: Numbness/tremors/vertigo  Endocrine: Excessive thirst/polyuria/cold intolerance/heat intolerance  Psych: Depression/anxiety    Past Medical History:   Diagnosis Date    Atrial fibrillation (HCC) 12/29/2011    Chickenpox     Essential hypertension, benign 07/24/2023    states controlled on meds    Hemorrhagic disorder (HCC)     on eliquis    High cholesterol     Long term (current) use of anticoagulants 12/29/2011    Obesity 12/29/2011    Other general symptoms(780.99)     Pure hypercholesterolemia 12/29/2011    Sleep apnea 12/29/2011    cpap    Unspecified hypothyroidism 12/29/2011       Past Surgical History:   Procedure Laterality Date    APPENDECTOMY      1994       Social History     Socioeconomic History    Marital status:      Spouse name: Not on file    Number of children: Not on file    Years of education: Not on file    Highest education level: Not on file   Occupational History    Not on file   Tobacco Use    Smoking status: Never    Smokeless tobacco: Never   Vaping Use    Vaping Use: Never used   Substance and Sexual Activity    Alcohol use: No    Drug use: Not Currently    Sexual activity: Not on file   Other Topics Concern    Not on file   Social History Narrative    Not on file     Social Determinants of Health     Financial Resource Strain: Not on file   Food Insecurity: Not on file  "  Transportation Needs: Not on file   Physical Activity: Not on file   Stress: Not on file   Social Connections: Not on file   Intimate Partner Violence: Not on file   Housing Stability: Not on file       Family History   Problem Relation Age of Onset    Heart Disease Mother         ACUTE MI IN HER 60s.    Heart Disease Father         ACUTE MI IN HIS 50s.       No Known Allergies    Current Outpatient Medications   Medication Sig Dispense Refill    sotalol (BETAPACE) 120 MG tablet Take 1 Tablet by mouth 2 times a day. 60 Tablet 11    ELIQUIS 5 MG Tab TAKE 1 TABLET TWICE A  Tablet 3    metoprolol SR (TOPROL XL) 25 MG TABLET SR 24 HR Take 2 Tablets by mouth 2 times a day. 180 Tablet 3    levothyroxine (SYNTHROID) 150 MCG Tab Take 1 Tablet by mouth every morning on an empty stomach. 90 Tablet 0    simvastatin (ZOCOR) 10 MG Tab Take 1 Tablet by mouth every evening. 90 Tablet 0    valsartan (DIOVAN) 160 MG Tab Take 1 Tablet by mouth 2 times a day. 180 Tablet 0     No current facility-administered medications for this visit.       Physical Exam:  Vitals:    12/28/23 0829   BP: 130/86   BP Location: Left arm   Patient Position: Sitting   BP Cuff Size: Large adult   Pulse: 69   Resp: 14   SpO2: 96%   Weight: (!) 127 kg (280 lb)   Height: 1.854 m (6' 1\")     General appearance: NAD, conversant   Eyes: anicteric sclerae, moist conjunctivae; no lid-lag; PERRLA  HENT: Atraumatic; oropharynx clear with moist mucous membranes and no mucosal ulcerations; normal hard and soft palate  Neck: Trachea midline; FROM, supple, no thyromegaly or lymphadenopathy  Lungs: CTA, with normal respiratory effort and no intercostal retractions  CV: RRR, no MRGs, no JVD  Abdomen: Soft, non-tender; no masses or HSM  Extremities: No peripheral edema or extremity lymphadenopathy  Skin: Normal temperature, turgor and texture; no rash, ulcers or subcutaneous nodules  Psych: Appropriate affect, alert and oriented to person, place and " "time    Data:  Lipids:   No results found for: \"CHOLSTRLTOT\", \"TRIGLYCERIDE\", \"HDL\", \"LDL\"     BMP:  Lab Results   Component Value Date/Time    SODIUM 133 (L) 09/27/2023 1555    POTASSIUM 4.2 09/27/2023 1555    CHLORIDE 102 09/27/2023 1555    CO2 20 09/27/2023 1555    GLUCOSE 97 09/27/2023 1555    BUN 22 09/27/2023 1555    CREATININE 0.82 09/27/2023 1555    CALCIUM 9.6 09/27/2023 1555    ANION 11.0 09/27/2023 1555        TSH:   Lab Results   Component Value Date/Time    TSHULTRASEN 6.380 (H) 09/06/2023 1559        THYROXINE (T4):   No results found for: \"FREEDIR\"     CBC:   Lab Results   Component Value Date/Time    WBC 7.1 09/27/2023 03:55 PM    RBC 4.67 (L) 09/27/2023 03:55 PM    HEMOGLOBIN 14.9 09/27/2023 03:55 PM    HEMATOCRIT 45.0 09/27/2023 03:55 PM    MCV 96.4 09/27/2023 03:55 PM    MCH 31.9 09/27/2023 03:55 PM    MCHC 33.1 09/27/2023 03:55 PM    RDW 48.6 09/27/2023 03:55 PM    PLATELETCT 221 09/27/2023 03:55 PM    MPV 9.7 09/27/2023 03:55 PM    NEUTSPOLYS 60.60 08/07/2023 08:04 AM    LYMPHOCYTES 27.60 08/07/2023 08:04 AM    MONOCYTES 8.40 08/07/2023 08:04 AM    EOSINOPHILS 2.40 08/07/2023 08:04 AM    BASOPHILS 0.60 08/07/2023 08:04 AM    IMMGRAN 0.40 08/07/2023 08:04 AM    NRBC 0.00 08/07/2023 08:04 AM    NEUTS 4.06 08/07/2023 08:04 AM    LYMPHS 1.85 08/07/2023 08:04 AM    MONOS 0.56 08/07/2023 08:04 AM    EOS 0.16 08/07/2023 08:04 AM    BASO 0.04 08/07/2023 08:04 AM    IMMGRANAB 0.03 08/07/2023 08:04 AM    NRBCAB 0.00 08/07/2023 08:04 AM        CBC w/o DIFF  Lab Results   Component Value Date/Time    WBC 7.1 09/27/2023 03:55 PM    RBC 4.67 (L) 09/27/2023 03:55 PM    HEMOGLOBIN 14.9 09/27/2023 03:55 PM    MCV 96.4 09/27/2023 03:55 PM    MCH 31.9 09/27/2023 03:55 PM    MCHC 33.1 09/27/2023 03:55 PM    RDW 48.6 09/27/2023 03:55 PM    MPV 9.7 09/27/2023 03:55 PM       Prior echo/stress reviewed: EF 55%      EKG interpreted by me: ELHAM    Impression/Plan:  1. Atrial fibrillation, persistent (HCC)  EKG    sotalol " (BETAPACE) 120 MG tablet        Persistent afib  High risk medication use  Hypercoagulable state due to afib    - Maintaining NSR  - Stop amiodarone  - In 1 months start sotalol, prior tolerated 120mg PO BID  - Continue Eliquis  - Discussed repeat ablation if recurrence despite AAD    FV 3 months    A total of 43 minutes of time was spent on day of encounter reviewing medical record, performing history and examination, counseling, ordering medication/test/consults, collaborating with referring service, and documentation.      Zeb Ott MD  Cardiac Electrophysiology

## 2023-12-29 LAB — EKG IMPRESSION: NORMAL

## 2023-12-29 PROCEDURE — 93010 ELECTROCARDIOGRAM REPORT: CPT | Performed by: INTERNAL MEDICINE

## 2024-01-26 ENCOUNTER — PATIENT MESSAGE (OUTPATIENT)
Dept: CARDIOLOGY | Facility: MEDICAL CENTER | Age: 69
End: 2024-01-26
Payer: MEDICARE

## 2024-01-26 DIAGNOSIS — I48.19 ATRIAL FIBRILLATION, PERSISTENT (HCC): ICD-10-CM

## 2024-01-31 DIAGNOSIS — I48.0 PAF (PAROXYSMAL ATRIAL FIBRILLATION) (HCC): ICD-10-CM

## 2024-02-01 DIAGNOSIS — I10 ESSENTIAL HYPERTENSION, BENIGN: ICD-10-CM

## 2024-02-01 NOTE — TELEPHONE ENCOUNTER
Is the patient due for a refill? No Pharmacy change    Was the patient seen the past year? Yes    Date of last office visit: 12/28/23    Does the patient have an upcoming appointment?  Yes   If yes, When? 4/1/24    Provider to refill:CONCHA    Does the patients insurance require a 100 day supply?  No

## 2024-02-02 RX ORDER — METOPROLOL SUCCINATE 25 MG/1
50 TABLET, EXTENDED RELEASE ORAL 2 TIMES DAILY
Qty: 360 TABLET | Refills: 3 | Status: SHIPPED | OUTPATIENT
Start: 2024-02-02

## 2024-02-13 ENCOUNTER — TELEPHONE (OUTPATIENT)
Dept: CARDIOLOGY | Facility: MEDICAL CENTER | Age: 69
End: 2024-02-13

## 2024-02-13 NOTE — TELEPHONE ENCOUNTER
Caller: Joleen - Pharmacist    Topic/issue: Patient is taking metoprolol and sotalol. Joleen wanted to be sure he is supposed to take both as they are in the same family of medication. Joleen would like a call back to discuss.    Reference #: [id number]    Callback Number: [phone]      Thank you,  Johanny BENAVIDES

## 2024-02-29 NOTE — OR NURSING
February 29, 2024      Rosie Blake  [id number] [facility] [address]        To Whom It May Concern:    Rosie Blake was seen in our clinic. She may return to school without restrictions.      Sincerely,        SARKIS Stack CNP           Received call from lab that pt's green top sample (for CMP) hemolyzed and will need to be redrawn.  Call made to patient, no answer.  Message sent to Kamala with Dr. Ott's office.  Okay to draw CMP day of procedure if pt unable to come back.  Chart updated to draw CMP day of surgery.

## 2024-04-01 ENCOUNTER — OFFICE VISIT (OUTPATIENT)
Dept: CARDIOLOGY | Facility: MEDICAL CENTER | Age: 69
End: 2024-04-01
Attending: INTERNAL MEDICINE
Payer: MEDICARE

## 2024-04-01 VITALS
HEART RATE: 67 BPM | BODY MASS INDEX: 39.1 KG/M2 | DIASTOLIC BLOOD PRESSURE: 88 MMHG | OXYGEN SATURATION: 96 % | SYSTOLIC BLOOD PRESSURE: 140 MMHG | HEIGHT: 73 IN | RESPIRATION RATE: 14 BRPM | WEIGHT: 295 LBS

## 2024-04-01 DIAGNOSIS — I48.19 ATRIAL FIBRILLATION, PERSISTENT (HCC): ICD-10-CM

## 2024-04-01 LAB — EKG IMPRESSION: NORMAL

## 2024-04-01 PROCEDURE — 93010 ELECTROCARDIOGRAM REPORT: CPT | Performed by: INTERNAL MEDICINE

## 2024-04-01 PROCEDURE — 99212 OFFICE O/P EST SF 10 MIN: CPT | Performed by: INTERNAL MEDICINE

## 2024-04-01 PROCEDURE — 93005 ELECTROCARDIOGRAM TRACING: CPT | Performed by: INTERNAL MEDICINE

## 2024-04-01 PROCEDURE — 3079F DIAST BP 80-89 MM HG: CPT | Performed by: INTERNAL MEDICINE

## 2024-04-01 PROCEDURE — 3077F SYST BP >= 140 MM HG: CPT | Performed by: INTERNAL MEDICINE

## 2024-04-01 PROCEDURE — 99214 OFFICE O/P EST MOD 30 MIN: CPT | Performed by: INTERNAL MEDICINE

## 2024-04-01 ASSESSMENT — FIBROSIS 4 INDEX: FIB4 SCORE: 1.41

## 2024-04-01 NOTE — PROGRESS NOTES
Arrhythmia Clinic Note (Established patient)    DOS: 4/1/2024    Chief complaint/Reason for consult: Afib    Interval History: 68-year-old male with a history of hypertension and persistent atrial fibrillation failed sotalol.  Underwent ablation last year.  Had some recurrent A-fib and was placed on amiodarone and reverted to sinus rhythm.  3 months ago we stopped amiodarone and restarted sotalol.  No atrial fibrillation since.  Doing well without complaints.    ROS (+ highlighted in bold):  Constitutional: Fevers/chills/fatigue/weightloss  HEENT: Blurry vision/eye pain/sore throat/hearing loss  Respiratory: Shortness of breath/cough  Cardiovascular: Chest pain/palpitations/edema/orthopnea/syncope  GI: Nausea/vomitting/diarrhea  MSK: Arthralgias/myagias/muscle weakness  Skin: Rash/sores  Neurological: Numbness/tremors/vertigo  Endocrine: Excessive thirst/polyuria/cold intolerance/heat intolerance  Psych: Depression/anxiety    Past Medical History:   Diagnosis Date    Atrial fibrillation (HCC) 12/29/2011    Chickenpox     Essential hypertension, benign 07/24/2023    states controlled on meds    Hemorrhagic disorder (HCC)     on eliquis    High cholesterol     Long term (current) use of anticoagulants 12/29/2011    Obesity 12/29/2011    Other general symptoms(780.99)     Pure hypercholesterolemia 12/29/2011    Sleep apnea 12/29/2011    cpap    Unspecified hypothyroidism 12/29/2011       Past Surgical History:   Procedure Laterality Date    APPENDECTOMY      1994       Social History     Socioeconomic History    Marital status:      Spouse name: Not on file    Number of children: Not on file    Years of education: Not on file    Highest education level: Not on file   Occupational History    Not on file   Tobacco Use    Smoking status: Never    Smokeless tobacco: Never   Vaping Use    Vaping Use: Never used   Substance and Sexual Activity    Alcohol use: No    Drug use: Not Currently    Sexual activity: Not on  "file   Other Topics Concern    Not on file   Social History Narrative    Not on file     Social Determinants of Health     Financial Resource Strain: Not on file   Food Insecurity: Not on file   Transportation Needs: Not on file   Physical Activity: Not on file   Stress: Not on file   Social Connections: Not on file   Intimate Partner Violence: Not on file   Housing Stability: Not on file       Family History   Problem Relation Age of Onset    Heart Disease Mother         ACUTE MI IN HER 60s.    Heart Disease Father         ACUTE MI IN HIS 50s.       No Known Allergies    Current Outpatient Medications   Medication Sig Dispense Refill    metoprolol SR (TOPROL XL) 25 MG TABLET SR 24 HR Take 2 Tablets by mouth 2 times a day. 360 Tablet 3    sotalol (BETAPACE) 120 MG tablet Take 1 Tablet by mouth 2 times a day. 180 Tablet 1    apixaban (ELIQUIS) 5mg Tab Take 1 Tablet by mouth 2 times a day. 180 Tablet 3    levothyroxine (SYNTHROID) 150 MCG Tab Take 1 Tablet by mouth every morning on an empty stomach. 90 Tablet 0    simvastatin (ZOCOR) 10 MG Tab Take 1 Tablet by mouth every evening. 90 Tablet 0    valsartan (DIOVAN) 160 MG Tab Take 1 Tablet by mouth 2 times a day. 180 Tablet 0     No current facility-administered medications for this visit.       Physical Exam:  Vitals:    04/01/24 0811   BP: (!) 140/88   BP Location: Left arm   Patient Position: Sitting   BP Cuff Size: Large adult   Pulse: 67   Resp: 14   SpO2: 96%   Weight: (!) 134 kg (295 lb)   Height: 1.854 m (6' 1\")     General appearance: NAD, conversant   Eyes: anicteric sclerae, moist conjunctivae; no lid-lag; PERRLA  HENT: Atraumatic; oropharynx clear with moist mucous membranes and no mucosal ulcerations; normal hard and soft palate  Neck: Trachea midline; FROM, supple, no thyromegaly or lymphadenopathy  Lungs: CTA, with normal respiratory effort and no intercostal retractions  CV: RRR, no MRGs, no JVD  Abdomen: Soft, non-tender; no masses or HSM  Extremities: " "No peripheral edema or extremity lymphadenopathy  Skin: Normal temperature, turgor and texture; no rash, ulcers or subcutaneous nodules  Psych: Appropriate affect, alert and oriented to person, place and time    Data:  Lipids:   No results found for: \"CHOLSTRLTOT\", \"TRIGLYCERIDE\", \"HDL\", \"LDL\"     BMP:  Lab Results   Component Value Date/Time    SODIUM 133 (L) 09/27/2023 1555    POTASSIUM 4.2 09/27/2023 1555    CHLORIDE 102 09/27/2023 1555    CO2 20 09/27/2023 1555    GLUCOSE 97 09/27/2023 1555    BUN 22 09/27/2023 1555    CREATININE 0.82 09/27/2023 1555    CALCIUM 9.6 09/27/2023 1555    ANION 11.0 09/27/2023 1555        TSH:   Lab Results   Component Value Date/Time    TSHULTRASEN 6.380 (H) 09/06/2023 1559        THYROXINE (T4):   No results found for: \"FREEDIR\"     CBC:   Lab Results   Component Value Date/Time    WBC 7.1 09/27/2023 03:55 PM    RBC 4.67 (L) 09/27/2023 03:55 PM    HEMOGLOBIN 14.9 09/27/2023 03:55 PM    HEMATOCRIT 45.0 09/27/2023 03:55 PM    MCV 96.4 09/27/2023 03:55 PM    MCH 31.9 09/27/2023 03:55 PM    MCHC 33.1 09/27/2023 03:55 PM    RDW 48.6 09/27/2023 03:55 PM    PLATELETCT 221 09/27/2023 03:55 PM    MPV 9.7 09/27/2023 03:55 PM    NEUTSPOLYS 60.60 08/07/2023 08:04 AM    LYMPHOCYTES 27.60 08/07/2023 08:04 AM    MONOCYTES 8.40 08/07/2023 08:04 AM    EOSINOPHILS 2.40 08/07/2023 08:04 AM    BASOPHILS 0.60 08/07/2023 08:04 AM    IMMGRAN 0.40 08/07/2023 08:04 AM    NRBC 0.00 08/07/2023 08:04 AM    NEUTS 4.06 08/07/2023 08:04 AM    LYMPHS 1.85 08/07/2023 08:04 AM    MONOS 0.56 08/07/2023 08:04 AM    EOS 0.16 08/07/2023 08:04 AM    BASO 0.04 08/07/2023 08:04 AM    IMMGRANAB 0.03 08/07/2023 08:04 AM    NRBCAB 0.00 08/07/2023 08:04 AM        CBC w/o DIFF  Lab Results   Component Value Date/Time    WBC 7.1 09/27/2023 03:55 PM    RBC 4.67 (L) 09/27/2023 03:55 PM    HEMOGLOBIN 14.9 09/27/2023 03:55 PM    MCV 96.4 09/27/2023 03:55 PM    MCH 31.9 09/27/2023 03:55 PM    MCHC 33.1 09/27/2023 03:55 PM    RDW " 48.6 09/27/2023 03:55 PM    MPV 9.7 09/27/2023 03:55 PM       Prior echo/stress reviewed: Ejection fraction 55%    EKG interpreted by me: Sinus rhythm    Impression/Plan:  1. Atrial fibrillation, persistent (HCC)  EKG    Comp Metabolic Panel    CBC WITHOUT DIFFERENTIAL        1.  Longstanding persistent atrial fibrillation status post ablation  2.  High risk medication use    -Check CBC and CMP for high risk medication use, sotalol  -Continue sotalol, QT less than 500    Follow-up in 6 months with APN  Requests new general cardiologist, will refer    Zeb Ott MD  Cardiac Electrophysiology

## 2024-05-22 ENCOUNTER — TELEPHONE (OUTPATIENT)
Dept: CARDIOLOGY | Facility: MEDICAL CENTER | Age: 69
End: 2024-05-22
Payer: MEDICARE

## 2024-05-22 NOTE — TELEPHONE ENCOUNTER
Called pt in regards to lab work that was ordered at previous OV. Patient states that they will get it done before upcoming appt. Pt has follow up appointment scheduled with MK on 05/23/2024.

## 2024-05-23 ENCOUNTER — OFFICE VISIT (OUTPATIENT)
Dept: CARDIOLOGY | Facility: MEDICAL CENTER | Age: 69
End: 2024-05-23
Attending: INTERNAL MEDICINE
Payer: MEDICARE

## 2024-05-23 VITALS
BODY MASS INDEX: 39.36 KG/M2 | OXYGEN SATURATION: 95 % | SYSTOLIC BLOOD PRESSURE: 128 MMHG | HEART RATE: 56 BPM | DIASTOLIC BLOOD PRESSURE: 76 MMHG | RESPIRATION RATE: 16 BRPM | HEIGHT: 73 IN | WEIGHT: 297 LBS

## 2024-05-23 DIAGNOSIS — I10 ESSENTIAL HYPERTENSION, BENIGN: ICD-10-CM

## 2024-05-23 DIAGNOSIS — Z79.899 HIGH RISK MEDICATION USE: ICD-10-CM

## 2024-05-23 DIAGNOSIS — I48.0 PAF (PAROXYSMAL ATRIAL FIBRILLATION) (HCC): ICD-10-CM

## 2024-05-23 DIAGNOSIS — D68.69 HYPERCOAGULABLE STATE DUE TO PAROXYSMAL ATRIAL FIBRILLATION (HCC): ICD-10-CM

## 2024-05-23 DIAGNOSIS — I48.0 HYPERCOAGULABLE STATE DUE TO PAROXYSMAL ATRIAL FIBRILLATION (HCC): ICD-10-CM

## 2024-05-23 DIAGNOSIS — I48.19 ATRIAL FIBRILLATION, PERSISTENT (HCC): ICD-10-CM

## 2024-05-23 DIAGNOSIS — E78.2 MIXED HYPERLIPIDEMIA: ICD-10-CM

## 2024-05-23 RX ORDER — SIMVASTATIN 10 MG
10 TABLET ORAL NIGHTLY
Qty: 90 TABLET | Refills: 0 | Status: SHIPPED | OUTPATIENT
Start: 2024-05-23

## 2024-05-23 RX ORDER — METOPROLOL SUCCINATE 25 MG/1
50 TABLET, EXTENDED RELEASE ORAL 2 TIMES DAILY
Qty: 360 TABLET | Refills: 3 | Status: SHIPPED | OUTPATIENT
Start: 2024-05-23

## 2024-05-23 RX ORDER — VALSARTAN 160 MG/1
160 TABLET ORAL 2 TIMES DAILY
Qty: 180 TABLET | Refills: 0 | Status: SHIPPED | OUTPATIENT
Start: 2024-05-23

## 2024-05-23 RX ORDER — SOTALOL HYDROCHLORIDE 120 MG/1
120 TABLET ORAL 2 TIMES DAILY
Qty: 180 TABLET | Refills: 1 | Status: SHIPPED | OUTPATIENT
Start: 2024-05-23

## 2024-05-23 ASSESSMENT — ENCOUNTER SYMPTOMS
SHORTNESS OF BREATH: 0
MYALGIAS: 0
LOSS OF CONSCIOUSNESS: 0
PND: 0
DIZZINESS: 0
COUGH: 0
ORTHOPNEA: 0
PALPITATIONS: 0

## 2024-05-23 ASSESSMENT — FIBROSIS 4 INDEX: FIB4 SCORE: 1.43

## 2024-05-23 NOTE — PROGRESS NOTES
"    Cardiology Initial Consultation Note    Date of note:    5/23/2024    Primary Care Provider: Omar Pineda M.D.  Referring Provider: No ref. provider found    Patient Name: Myke Bustos   YOB: 1955  MRN:              [id number]    Chief Complaint   Patient presents with    Hypertension     Essential hypertension    Atrial Fibrillation     Atrial fibrillation, persistent (HCC)       History of Present Illness: Mr. Myke Bustos is a 69 y.o. male with past medical history significant for persistent atrial fibrillation status post ablation, currently on antiarrhythmic therapy with sotalol, sleep apnea, hypertension, hypothyroidism who presents to the cardiology office for follow-up.    He underwent ablation in 2023 where he had PVI, ablation of posterior wall, vein of Pete ethanol ablation, lateral mitral isthmus line with additional CS ablation sites and CTI line.  Following atrial fibrillation ablation, he had recurrence and started on amiodarone.  This was subsequently discontinued and transition to sotalol by EP cardiology.  He presents to the office today to establish care with a new general cardiologist at the patient's request.    He has no major complaints today.  He denies having chest pain, dyspnea, palpitations, lightheadedness/dizziness or episodes syncope.  Has been compliant with his cardiac medications including sotalol as well as Eliquis for systemic anticoagulation.  No major bleeding issues while on Eliquis.      Cardiovascular Risk Factors:  1. Smoking status: Denies  2. Type II Diabetes Mellitus: Denies No results found for: \"HBA1C\"  3. Hypertension: Yes  4. Dyslipidemia: Yes No results found for: \"CHOLSTRLTOT\", \"LDL\", \"HDL\", \"TRIGLYCERIDE\"  5. Family history of early Coronary Artery Disease in a first degree relative (Male less than 55 years of age; Female less than 65 years of age): Denies      Review of Systems:  Review of Systems   Respiratory:  Negative for cough " and shortness of breath.    Cardiovascular:  Negative for chest pain, palpitations, orthopnea, leg swelling and PND.   Musculoskeletal:  Negative for joint pain and myalgias.   Neurological:  Negative for dizziness and loss of consciousness.       Past Medical History:   Diagnosis Date    Atrial fibrillation (HCC) 12/29/2011    Chickenpox     Essential hypertension, benign 07/24/2023    states controlled on meds    Hemorrhagic disorder (HCC)     on eliquis    High cholesterol     Long term (current) use of anticoagulants 12/29/2011    Obesity 12/29/2011    Other general symptoms(780.99)     Pure hypercholesterolemia 12/29/2011    Sleep apnea 12/29/2011    cpap    Unspecified hypothyroidism 12/29/2011         Past Surgical History:   Procedure Laterality Date    APPENDECTOMY      1994         Current Outpatient Medications   Medication Sig Dispense Refill    apixaban (ELIQUIS) 5mg Tab Take 1 Tablet by mouth 2 times a day. 180 Tablet 3    metoprolol SR (TOPROL XL) 25 MG TABLET SR 24 HR Take 2 Tablets by mouth 2 times a day. 360 Tablet 3    simvastatin (ZOCOR) 10 MG Tab Take 1 Tablet by mouth every evening. 90 Tablet 0    valsartan (DIOVAN) 160 MG Tab Take 1 Tablet by mouth 2 times a day. 180 Tablet 0    sotalol (BETAPACE) 120 MG tablet Take 1 Tablet by mouth 2 times a day. 180 Tablet 1    levothyroxine (SYNTHROID) 150 MCG Tab Take 1 Tablet by mouth every morning on an empty stomach. 90 Tablet 0     No current facility-administered medications for this visit.         No Known Allergies      Family History   Problem Relation Age of Onset    Heart Disease Mother         ACUTE MI IN HER 60s.    Heart Disease Father         ACUTE MI IN HIS 50s.         Social History     Socioeconomic History    Marital status:      Spouse name: Not on file    Number of children: Not on file    Years of education: Not on file    Highest education level: Not on file   Occupational History    Not on file   Tobacco Use    Smoking  "status: Never    Smokeless tobacco: Never   Vaping Use    Vaping status: Never Used   Substance and Sexual Activity    Alcohol use: No    Drug use: Not Currently    Sexual activity: Not on file   Other Topics Concern    Not on file   Social History Narrative    Not on file     Social Determinants of Health     Financial Resource Strain: Not on file   Food Insecurity: Not on file   Transportation Needs: Not on file   Physical Activity: Not on file   Stress: Not on file   Social Connections: Not on file   Intimate Partner Violence: Not on file   Housing Stability: Not on file         Physical Exam:  Ambulatory Vitals  /76 (BP Location: Left arm, Patient Position: Sitting, BP Cuff Size: Adult)   Pulse (!) 56   Resp 16   Ht 1.854 m (6' 1\")   Wt (!) 135 kg (297 lb)   SpO2 95%    Oxygen Therapy:  Pulse Oximetry: 95 %  BP Readings from Last 4 Encounters:   05/23/24 128/76   04/01/24 (!) 140/88   12/28/23 130/86   09/29/23 134/74       Weight/BMI: Body mass index is 39.18 kg/m².  Wt Readings from Last 4 Encounters:   05/23/24 (!) 135 kg (297 lb)   04/01/24 (!) 134 kg (295 lb)   12/28/23 (!) 127 kg (280 lb)   09/29/23 118 kg (260 lb 9.3 oz)         General: Not in acute distress, appears comfortable  HEENT: OP clear   Neck:  No carotid bruits, No JVD appreciated  CVS:  RRR, Normal S1, S2. No murmurs, rubs or gallops  Resp: Normal respiratory effort, lungs CTA bilaterally. No rales or rhonchi  Abdomen: Soft, non-distended, non-tender to palpation  Skin: No obvious rashes, no cyanosis  Neurological: Alert and oriented x3, moves all extremities, no focal neurologic deficits  Psychiatric: Appropriate affect  Extremities:   Extremities warm, pulses intact, no edema    Lab Data Review:  No results found for: \"CHOLSTRLTOT\", \"LDL\", \"HDL\", \"TRIGLYCERIDE\"    Lab Results   Component Value Date/Time    SODIUM 133 (L) 09/27/2023 03:55 PM    POTASSIUM 4.2 09/27/2023 03:55 PM    CHLORIDE 102 09/27/2023 03:55 PM    CO2 20 " "09/27/2023 03:55 PM    GLUCOSE 97 09/27/2023 03:55 PM    BUN 22 09/27/2023 03:55 PM    CREATININE 0.82 09/27/2023 03:55 PM     Lab Results   Component Value Date/Time    ALKPHOSPHAT 76 09/27/2023 03:55 PM    ASTSGOT 24 09/27/2023 03:55 PM    ALTSGPT 26 09/27/2023 03:55 PM    TBILIRUBIN 0.9 09/27/2023 03:55 PM      Lab Results   Component Value Date/Time    WBC 7.1 09/27/2023 03:55 PM    HEMOGLOBIN 14.9 09/27/2023 03:55 PM     No results found for: \"HBA1C\"      Cardiac Imaging and Procedures Review:    EKG dated 4/1/2024:   My personal interpretation is sinus rhythm, normal QT interval    Echo dated 12/2021:   Normal left ventricular systolic function.  The left ventricular ejection fraction is visually estimated to be 55%.  Mild mitral regurgitation.  The right ventricle is normal in size and systolic function.  Rhythm is atrial fibrillation.         Assessment & Plan     1. PAF (paroxysmal atrial fibrillation) (HCC)  apixaban (ELIQUIS) 5mg Tab      2. Hypercoagulable state due to paroxysmal atrial fibrillation (HCC)        3. High risk medication use  Comp Metabolic Panel    CBC WITHOUT DIFFERENTIAL      4. Mixed hyperlipidemia  simvastatin (ZOCOR) 10 MG Tab    Lipid Profile      5. Essential hypertension, benign  metoprolol SR (TOPROL XL) 25 MG TABLET SR 24 HR    valsartan (DIOVAN) 160 MG Tab      6. Atrial fibrillation, persistent (HCC)  sotalol (BETAPACE) 120 MG tablet            Medical Decision Making:  Mr. Myke Bustos is a 69 y.o. male with past medical history significant for persistent atrial fibrillation status post ablation, currently on antiarrhythmic therapy with sotalol, sleep apnea, hypertension, hypothyroidism who presents to the cardiology office for follow-up.    1. PAF (paroxysmal atrial fibrillation) (HCC)  2. Hypercoagulable state due to paroxysmal atrial fibrillation (HCC)  -Longstanding history of atrial fibrillation status post PVI in 2023.  Remains in sinus rhythm.  He will be maintained " on sotalol therapy.  Refills of sotalol 120 mg twice daily was sent to his pharmacy.  Continue Eliquis 5 mg twice daily.    3. High risk medication use  -He is currently taking antiarrhythmic therapy with sotalol.  QTc within normal limits based on recent EKG.  We will check labs including a CMP and CBC given high risk medication use.      4. Mixed hyperlipidemia  -Been maintained on simvastatin 10 mg daily.  No recent lipid panel on file.  We will check lipid panel to ensure no adjustments to his lipid-lowering therapy are needed.  Goal LDL should be less than 100.    5. Essential hypertension, benign  -Blood pressure is currently stable and at goal of less than 130/80 mmHg.  Continue metoprolol as well as valsartan.      () Today's E/M visit is associated with medical care services that serve as the continuing focal point for all needed health care services and/or with medical care services that  are part of ongoing care related to a patient's single, serious condition, or a complex condition: This includes  furnishing services to patients on an ongoing basis that result in care that is personalized  to the patient. The services result in a comprehensive, longitudinal, and continuous  relationship with the patient and involve delivery of team-based care that is accessible, coordinated with other practitioners and providers, and integrated with the broader health care landscape.     It was a pleasure seeing Mr. Myke Bustos in the office today. Return in about 6 months (around 11/23/2024) for Atrial fibrillation. Patient is aware to call the cardiology clinic with any questions or concerns.      Stefan Yao MD, [facility]  Cardiologist, [facility], [facility].  [address] [address]  Phone: [phone]  Fax: [phone]    Please note that this dictation was created using voice recognition software. I have made every  reasonable attempt to correct obvious errors, but it is possible there are errors of grammar and possibly content that I did not discover before finalizing the note.

## 2024-07-10 DIAGNOSIS — I48.19 ATRIAL FIBRILLATION, PERSISTENT (HCC): ICD-10-CM

## 2024-07-11 RX ORDER — SOTALOL HYDROCHLORIDE 120 MG/1
120 TABLET ORAL 2 TIMES DAILY
Qty: 180 TABLET | Refills: 0 | Status: SHIPPED | OUTPATIENT
Start: 2024-07-11

## 2024-10-08 ENCOUNTER — APPOINTMENT (OUTPATIENT)
Dept: CARDIOLOGY | Facility: MEDICAL CENTER | Age: 69
End: 2024-10-08
Attending: NURSE PRACTITIONER
Payer: MEDICARE

## 2024-10-08 DIAGNOSIS — I48.19 ATRIAL FIBRILLATION, PERSISTENT (HCC): ICD-10-CM

## 2024-10-09 RX ORDER — SOTALOL HYDROCHLORIDE 120 MG/1
120 TABLET ORAL 2 TIMES DAILY
Qty: 180 TABLET | Refills: 0 | Status: SHIPPED | OUTPATIENT
Start: 2024-10-09

## 2024-11-11 ENCOUNTER — TELEPHONE (OUTPATIENT)
Dept: CARDIOLOGY | Facility: MEDICAL CENTER | Age: 69
End: 2024-11-11
Payer: MEDICARE

## 2024-11-11 DIAGNOSIS — Z79.899 HIGH RISK MEDICATION USE: ICD-10-CM

## 2024-11-11 NOTE — TELEPHONE ENCOUNTER
Called pt in regards to lab work that was ordered at previous OV. Patient states he will get lab work done before upcoming appointment. Pt has follow up appointment scheduled with MK on 11.22.2024. Lab slips have been printed and sent to pt via email.

## 2024-11-19 LAB
CHOLEST SERPL-MCNC: 166 MG/DL
HDLC SERPL-MCNC: 59 MG/DL
LDLC SERPL CALC-MCNC: 86 MG/DL
TRIGL SERPL-MCNC: 110 MG/DL

## 2024-11-20 DIAGNOSIS — E78.2 MIXED HYPERLIPIDEMIA: ICD-10-CM

## 2024-11-22 ENCOUNTER — OFFICE VISIT (OUTPATIENT)
Dept: CARDIOLOGY | Facility: MEDICAL CENTER | Age: 69
End: 2024-11-22
Attending: INTERNAL MEDICINE
Payer: MEDICARE

## 2024-11-22 VITALS
HEART RATE: 70 BPM | BODY MASS INDEX: 40.95 KG/M2 | HEIGHT: 73 IN | SYSTOLIC BLOOD PRESSURE: 124 MMHG | WEIGHT: 309 LBS | RESPIRATION RATE: 18 BRPM | DIASTOLIC BLOOD PRESSURE: 70 MMHG | OXYGEN SATURATION: 95 %

## 2024-11-22 DIAGNOSIS — Z86.79 S/P ABLATION OF ATRIAL FIBRILLATION: ICD-10-CM

## 2024-11-22 DIAGNOSIS — I48.0 HYPERCOAGULABLE STATE DUE TO PAROXYSMAL ATRIAL FIBRILLATION (HCC): ICD-10-CM

## 2024-11-22 DIAGNOSIS — Z79.899 ENCOUNTER FOR LONG-TERM CURRENT USE OF HIGH RISK MEDICATION: ICD-10-CM

## 2024-11-22 DIAGNOSIS — I48.0 PAROXYSMAL ATRIAL FIBRILLATION (HCC): ICD-10-CM

## 2024-11-22 DIAGNOSIS — E78.2 MIXED HYPERLIPIDEMIA: ICD-10-CM

## 2024-11-22 DIAGNOSIS — D68.69 HYPERCOAGULABLE STATE DUE TO PAROXYSMAL ATRIAL FIBRILLATION (HCC): ICD-10-CM

## 2024-11-22 DIAGNOSIS — Z98.890 S/P ABLATION OF ATRIAL FIBRILLATION: ICD-10-CM

## 2024-11-22 DIAGNOSIS — I10 ESSENTIAL HYPERTENSION, BENIGN: ICD-10-CM

## 2024-11-22 LAB — EKG IMPRESSION: NORMAL

## 2024-11-22 PROCEDURE — 99212 OFFICE O/P EST SF 10 MIN: CPT | Performed by: INTERNAL MEDICINE

## 2024-11-22 PROCEDURE — 3078F DIAST BP <80 MM HG: CPT | Performed by: INTERNAL MEDICINE

## 2024-11-22 PROCEDURE — 3074F SYST BP LT 130 MM HG: CPT | Performed by: INTERNAL MEDICINE

## 2024-11-22 PROCEDURE — 99214 OFFICE O/P EST MOD 30 MIN: CPT | Performed by: INTERNAL MEDICINE

## 2024-11-22 PROCEDURE — 93005 ELECTROCARDIOGRAM TRACING: CPT | Performed by: INTERNAL MEDICINE

## 2024-11-22 PROCEDURE — 93010 ELECTROCARDIOGRAM REPORT: CPT | Performed by: INTERNAL MEDICINE

## 2024-11-22 PROCEDURE — G2211 COMPLEX E/M VISIT ADD ON: HCPCS | Performed by: INTERNAL MEDICINE

## 2024-11-22 RX ORDER — VALSARTAN 160 MG/1
160 TABLET ORAL 2 TIMES DAILY
Qty: 180 TABLET | Refills: 3 | Status: SHIPPED | OUTPATIENT
Start: 2024-11-22

## 2024-11-22 RX ORDER — SOTALOL HYDROCHLORIDE 120 MG/1
120 TABLET ORAL 2 TIMES DAILY
Qty: 180 TABLET | Refills: 3 | Status: SHIPPED | OUTPATIENT
Start: 2024-11-22

## 2024-11-22 RX ORDER — METOPROLOL SUCCINATE 25 MG/1
50 TABLET, EXTENDED RELEASE ORAL 2 TIMES DAILY
Qty: 360 TABLET | Refills: 3 | Status: SHIPPED | OUTPATIENT
Start: 2024-11-22

## 2024-11-22 RX ORDER — SIMVASTATIN 10 MG
10 TABLET ORAL NIGHTLY
Qty: 90 TABLET | Refills: 3 | Status: SHIPPED | OUTPATIENT
Start: 2024-11-22

## 2024-11-22 ASSESSMENT — FIBROSIS 4 INDEX: FIB4 SCORE: 1.43

## 2024-11-22 NOTE — PROGRESS NOTES
"    Cardiology Follow Up Consultation Note    Date of note:    11/22/2024  Primary Care Provider: Omar Pineda M.D.  Referring Provider: No ref. provider found    Patient Name: Myke Bustos   YOB: 1955  MRN:              [id number]    Chief Complaint   Patient presents with    Follow-Up     PAF (paroxysmal atrial fibrillation) (HCC)      Hypertension    Hyperlipidemia       History of Present Illness: Mr. Myke Bustos is a 69 y.o. male with past medical history significant for persistent atrial fibrillation status post ablation, currently on antiarrhythmic therapy with sotalol, sleep apnea, hypertension, hypothyroidism who presents to the cardiology office for follow-up.    In 2023, patient had AF ablation. He had PVI, ablation of posterior wall, vein of Pete ethanol ablation, lateral mitral isthmus line with additional CS ablation sites and CTI line.  Following atrial fibrillation ablation, he had recurrence and started on amiodarone.  This was subsequently discontinued and transition to sotalol by EP cardiology.      Interval History:  Since the patient's last office visit on 5/23/2024, he has been doing well.  Has been compliant with his cardiac medications.  Has not had recurrent atrial fibrillation.  No bleeding issues while on Eliquis.  No hemoptysis or hematemesis.  Denies having chest pain or dyspnea.  No lower extremity edema.  Has been compliant with his CPAP for known sleep apnea.      Cardiovascular Risk Factors:  1. Smoking status: Denies  2. Type II Diabetes Mellitus: Denies No results found for: \"HBA1C\"  3. Hypertension: Yes  4. Dyslipidemia: Yes No results found for: \"CHOLSTRLTOT\", \"LDL\", \"HDL\", \"TRIGLYCERIDE\"  5. Family history of early Coronary Artery Disease in a first degree relative (Male less than 55 years of age; Female less than 65 years of age): Denies      Review of Systems:  As per HPI.  Review of systems assessed and are negative except as stated " above.      Past Medical History:   Diagnosis Date    Atrial fibrillation (HCC) 12/29/2011    Chickenpox     Essential hypertension, benign 07/24/2023    states controlled on meds    Hemorrhagic disorder (HCC)     on eliquis    High cholesterol     Long term (current) use of anticoagulants 12/29/2011    Obesity 12/29/2011    Other general symptoms(780.99)     Pure hypercholesterolemia 12/29/2011    Sleep apnea 12/29/2011    cpap    Unspecified hypothyroidism 12/29/2011         Past Surgical History:   Procedure Laterality Date    APPENDECTOMY      1994         Current Outpatient Medications   Medication Sig Dispense Refill    apixaban (ELIQUIS) 5mg Tab Take 1 Tablet by mouth 2 times a day. 180 Tablet 3    metoprolol SR (TOPROL XL) 25 MG TABLET SR 24 HR Take 2 Tablets by mouth 2 times a day. 360 Tablet 3    simvastatin (ZOCOR) 10 MG Tab Take 1 Tablet by mouth every evening. 90 Tablet 3    sotalol (BETAPACE) 120 MG tablet Take 1 Tablet by mouth 2 times a day. 180 Tablet 3    valsartan (DIOVAN) 160 MG Tab Take 1 Tablet by mouth 2 times a day. 180 Tablet 3    levothyroxine (SYNTHROID) 150 MCG Tab Take 1 Tablet by mouth every morning on an empty stomach. 90 Tablet 0     No current facility-administered medications for this visit.         No Known Allergies      Family History   Problem Relation Age of Onset    Heart Disease Mother         ACUTE MI IN HER 60s.    Heart Disease Father         ACUTE MI IN HIS 50s.         Social History     Socioeconomic History    Marital status:      Spouse name: Not on file    Number of children: Not on file    Years of education: Not on file    Highest education level: Not on file   Occupational History    Not on file   Tobacco Use    Smoking status: Never    Smokeless tobacco: Never   Vaping Use    Vaping status: Never Used   Substance and Sexual Activity    Alcohol use: No    Drug use: Not Currently    Sexual activity: Not on file   Other Topics Concern    Not on file   Social  "History Narrative    Not on file     Social Drivers of Health     Financial Resource Strain: Not on file   Food Insecurity: Not on file   Transportation Needs: Not on file   Physical Activity: Not on file   Stress: Not on file   Social Connections: Not on file   Intimate Partner Violence: Not on file   Housing Stability: Not on file         Physical Exam:  Ambulatory Vitals  /70 (BP Location: Left arm, Patient Position: Sitting, BP Cuff Size: Adult)   Pulse 70   Resp 18   Ht 1.854 m (6' 1\")   Wt (!) 140 kg (309 lb)   SpO2 95%    Oxygen Therapy:  Pulse Oximetry: 95 %  BP Readings from Last 4 Encounters:   11/22/24 124/70   05/23/24 128/76   04/01/24 (!) 140/88   12/28/23 130/86       Weight/BMI: Body mass index is 40.77 kg/m².  Wt Readings from Last 4 Encounters:   11/22/24 (!) 140 kg (309 lb)   05/23/24 (!) 135 kg (297 lb)   04/01/24 (!) 134 kg (295 lb)   12/28/23 (!) 127 kg (280 lb)         General: Not in acute distress  HEENT: OP clear   Neck:  No carotid bruits, No JVD appreciated  CVS:  RRR, Normal S1, S2. No murmurs, rubs or gallops  Resp: Normal respiratory effort, lungs CTA bilaterally. No rales or rhonchi  Abdomen: Soft, non-distended  Skin: No obvious rashes, no cyanosis  Neurological: Alert and oriented x3, moves all extremities, no focal neurologic deficits  Psychiatric: Appropriate affect  Extremities:   Extremities warm, pulses intact, no edema    Lab Data Review:  Lab Results   Component Value Date/Time    CHOLSTRLTOT 166 11/19/2024 12:00 AM    LDL 86 11/19/2024 12:00 AM    HDL 59 11/19/2024 12:00 AM    TRIGLYCERIDE 110 11/19/2024 12:00 AM       Lab Results   Component Value Date/Time    SODIUM 133 (L) 09/27/2023 03:55 PM    POTASSIUM 4.2 09/27/2023 03:55 PM    CHLORIDE 102 09/27/2023 03:55 PM    CO2 20 09/27/2023 03:55 PM    GLUCOSE 97 09/27/2023 03:55 PM    BUN 22 09/27/2023 03:55 PM    CREATININE 0.82 09/27/2023 03:55 PM     Lab Results   Component Value Date/Time    ALKPHOSPHAT 76 " "09/27/2023 03:55 PM    ASTSGOT 24 09/27/2023 03:55 PM    ALTSGPT 26 09/27/2023 03:55 PM    TBILIRUBIN 0.9 09/27/2023 03:55 PM      Lab Results   Component Value Date/Time    WBC 7.1 09/27/2023 03:55 PM    HEMOGLOBIN 14.9 09/27/2023 03:55 PM     No results found for: \"HBA1C\"      Cardiac Imaging and Procedures Review:    EKG dated 4/1/2024:   My personal interpretation is sinus rhythm, normal QT interval    EKG dated 11/22/2024:  Sinus rhythm, normal QTc interval    Echo dated 12/2021:   Normal left ventricular systolic function.  The left ventricular ejection fraction is visually estimated to be 55%.  Mild mitral regurgitation.  The right ventricle is normal in size and systolic function.  Rhythm is atrial fibrillation.         Assessment & Plan     1. Paroxysmal atrial fibrillation (HCC)  EKG      2. S/P ablation of atrial fibrillation  apixaban (ELIQUIS) 5mg Tab      3. Hypercoagulable state due to paroxysmal atrial fibrillation (HCC)  sotalol (BETAPACE) 120 MG tablet      4. Essential hypertension, benign  metoprolol SR (TOPROL XL) 25 MG TABLET SR 24 HR    valsartan (DIOVAN) 160 MG Tab      5. Mixed hyperlipidemia  simvastatin (ZOCOR) 10 MG Tab      6. Encounter for long-term current use of high risk medication              Medical Decision Making:  Mr. Myke Bustos is a 69 y.o. male with past medical history significant for persistent atrial fibrillation status post ablation, currently on antiarrhythmic therapy with sotalol, sleep apnea, hypertension, hypothyroidism who presents to the cardiology office for follow-up.    1. Paroxysmal atrial fibrillation (HCC)  2. S/P ablation of atrial fibrillation  -Patient remains stable from a cardiovascular standpoint.  Has undergone extensive ablation procedure for rhythm control strategy.  He remains in sinus rhythm today.  On sotalol therapy.  EKG within normal limits without QTc prolongation.  -Continue sotalol 120 mg twice daily.    3. Hypercoagulable state due to " paroxysmal atrial fibrillation (HCC)  -Tolerating OAC without issues.  Continue apixaban 5 mg twice daily    4. Essential hypertension, benign  -Pressure stable.  Continue metoprolol and valsartan.    5. Mixed hyperlipidemia  -LDL currently at goal of less than 100.  No changes needed.  Continue simvastatin 10 mg daily.    6. Encounter for long-term current use of high risk medication  -Patient has been on antiarrhythmic therapy with sotalol 120 mg twice daily with success in maintaining sinus rhythm.  EKG today demonstrates sinus rhythm with normal QTc interval.  Refills will be sent to pharmacy.    () Today's E/M visit is associated with medical care services that serve as the continuing focal point for all needed health care services and/or with medical care services that  are part of ongoing care related to a patient's single, serious condition, or a complex condition: This includes  furnishing services to patients on an ongoing basis that result in care that is personalized  to the patient. The services result in a comprehensive, longitudinal, and continuous  relationship with the patient and involve delivery of team-based care that is accessible, coordinated with other practitioners and providers, and integrated with the broader health care landscape.       It was a pleasure seeing Mr. Myke Bustos in the office today. Return in about 6 months (around 5/22/2025) for Atrial fibrillation. Patient is aware to call the cardiology clinic with any questions or concerns.      Stefan Yao MD, [facility]  Cardiologist, [facility], [address]  [address] [address]  Phone: [phone]  Fax: [phone]    Please note that this dictation was created using voice recognition software. I have made every reasonable attempt to correct obvious errors, but it is possible there are errors of grammar and possibly content that I did not  discover before finalizing the note.

## 2024-11-25 ENCOUNTER — TELEPHONE (OUTPATIENT)
Dept: CARDIOLOGY | Facility: MEDICAL CENTER | Age: 69
End: 2024-11-25
Payer: MEDICARE

## 2024-11-25 NOTE — TELEPHONE ENCOUNTER
Received Refill PA request via [employer]  for ELIQUIS 5MG TAB. (Quantity:60, Day Supply:30)     Insurance: [employer]  Member ID:  [id number]  BIN: [id number]  PCN: [id number]  Group: [id number]     Ran Test claim via [facility] medication Pays for a 173.76 copay. Will outreach to patient to offer specialty pharmacy services and or release to preferred pharmacy

## 2024-12-20 ENCOUNTER — TELEPHONE (OUTPATIENT)
Dept: CARDIOLOGY | Facility: MEDICAL CENTER | Age: 69
End: 2024-12-20
Payer: MEDICARE

## 2024-12-20 DIAGNOSIS — I48.0 PAROXYSMAL ATRIAL FIBRILLATION (HCC): ICD-10-CM

## 2024-12-20 NOTE — TELEPHONE ENCOUNTER
Phone Number Called: [phone]     Call outcome: Left detailed message for patient. Informed to call back with any additional questions.    Message: Called to inform patient that short supply sent to his pharmacy for the Eliquis. Unable to reach. Left detailed message and for patient to call back with any questions.

## 2024-12-20 NOTE — TELEPHONE ENCOUNTER
DELANEY    Caller: Myke Bustos     Topic/issue: Patient usually get he apixaban (ELIQUIS) 5mg Tab on mail order. There has been a delay and he is asking for a 5 day supply until it can show up.  Please send to:   [id number] - [place], [place] - [address] [facility] [[id number]]    Callback Number: [phone]     Thank you,  Linda POLANCO

## 2024-12-28 DIAGNOSIS — Z98.890 S/P ABLATION OF ATRIAL FIBRILLATION: ICD-10-CM

## 2024-12-28 DIAGNOSIS — I48.0 PAROXYSMAL ATRIAL FIBRILLATION (HCC): ICD-10-CM

## 2024-12-28 DIAGNOSIS — Z86.79 S/P ABLATION OF ATRIAL FIBRILLATION: ICD-10-CM

## 2024-12-30 DIAGNOSIS — Z98.890 S/P ABLATION OF ATRIAL FIBRILLATION: ICD-10-CM

## 2024-12-30 DIAGNOSIS — Z86.79 S/P ABLATION OF ATRIAL FIBRILLATION: ICD-10-CM

## 2024-12-31 ENCOUNTER — PATIENT MESSAGE (OUTPATIENT)
Dept: CARDIOLOGY | Facility: MEDICAL CENTER | Age: 69
End: 2024-12-31
Payer: MEDICARE

## 2024-12-31 RX ORDER — APIXABAN 5 MG/1
TABLET, FILM COATED ORAL
Qty: 10 TABLET | OUTPATIENT
Start: 2024-12-31

## 2025-01-06 DIAGNOSIS — I48.0 PAROXYSMAL ATRIAL FIBRILLATION (HCC): ICD-10-CM

## 2025-01-06 DIAGNOSIS — I48.0 HYPERCOAGULABLE STATE DUE TO PAROXYSMAL ATRIAL FIBRILLATION (HCC): ICD-10-CM

## 2025-01-06 DIAGNOSIS — D68.69 HYPERCOAGULABLE STATE DUE TO PAROXYSMAL ATRIAL FIBRILLATION (HCC): ICD-10-CM

## 2025-01-07 RX ORDER — SOTALOL HYDROCHLORIDE 120 MG/1
120 TABLET ORAL 2 TIMES DAILY
Qty: 180 TABLET | Refills: 3 | Status: SHIPPED | OUTPATIENT
Start: 2025-01-07

## 2025-01-11 DIAGNOSIS — I10 ESSENTIAL HYPERTENSION, BENIGN: ICD-10-CM

## 2025-01-13 RX ORDER — METOPROLOL SUCCINATE 25 MG/1
50 TABLET, EXTENDED RELEASE ORAL 2 TIMES DAILY
Qty: 360 TABLET | Refills: 2 | Status: SHIPPED | OUTPATIENT
Start: 2025-01-13

## 2025-04-21 PROBLEM — M16.11 PRIMARY OSTEOARTHRITIS OF RIGHT HIP: Status: ACTIVE | Noted: 2025-04-21

## 2025-05-13 ENCOUNTER — TELEPHONE (OUTPATIENT)
Dept: CARDIOLOGY | Facility: MEDICAL CENTER | Age: 70
End: 2025-05-13
Payer: MEDICARE

## 2025-05-13 NOTE — LETTER
PROCEDURE/SURGERY CLEARANCE FORM      Encounter Date: 5/13/2025    Patient: Myke Bustos  YOB: 1955    CARDIOLOGIST:  Stefan SAN M.D.     REFERRING DOCTOR:  No ref. provider found      The following procedure/surgery: Right total hip arthroplasty                                            Additional comments:  During last office visit, patient was stable from a cardiovascular standpoint without cardiac issues.  If there has been no clinical change since patient's last office visit, no additional preoperative cardiac testing needed at this time.     Can hold Eliquis 3 to 5 days prior to surgery.  Restart anticoagulation when deemed safe from surgical standpoint.    PROCEDURE/SURGERY CLEARANCE FORM    Date: 5/14/2025   Patient Name: Myke Bustos    Dear Surgeon or Proceduralist,      Thank you for your request for cardiac stratification of our mutual patient Myke Bustos 1955. We have reviewed their [facility] records; and to the best of our understanding this patient has not had stenting, ablation, watchman, cardiothoracic surgery or hospitalization for cardiovascular reasons in the past 6 months.  Myke Bustos has been seen within the past 15 months and is considered to have non-modifiable cardiac risk for this low-risk procedure/surgery. They may proceed from a cardiovascular standpoint and may hold their antiplatelet/anticoagulation as briefly as possible. Please have patient resume this medication when hemodynamically stable to do so.     Aspirin or Prasugrel   - hold 7 days prior to procedure/surgery, resume when hemodynamically stable      Clopidrogrel or Ticagrelor  - hold 7 days for all neurological procedures, hold 5 days prior to all other procedure/surgery,  resume when hemodynamically stable     Warfarin - hold 7 days for all neurological procedures, hold 5 days prior to all other procedure/surgery and coordinate with [facility] ([phone])  INR testing and dose management.       If they have a mechanical heart valve, please coordinate with [facility] ([phone]) the proper management of their anticoagulant in the periprocedural or perioperative period.      Some patients have higher risk for cardiovascular complications or holding medication. If our patient has had prior complications of holding antiplatelet or anticoagulants in the past and we have seen them after these events, we have addressed these concerns with the patient. They are at an unknown degree of increased risk for recurrent complication.  You may hold anticoagulation/antiplatelets for the procedure or surgery if the benefits of the procedure or surgery outweigh this nonmodifiable risk.      If Myke Bustos 1955 has new symptoms of heart failure decompensation, unstable arrythmia, or angina please reach out and we will assess the patient.      If you have other patient-specific concerns, please feel free to reach out to the patient's cardiologist directly at [phone].     Thank you,       [facility]        Electronically Signed      MD Signature   Stefan SAN M.D.         Term  delivered by  section, current hospitalization

## 2025-05-14 ENCOUNTER — TELEPHONE (OUTPATIENT)
Dept: CARDIOLOGY | Facility: MEDICAL CENTER | Age: 70
End: 2025-05-14
Payer: MEDICARE

## 2025-05-14 NOTE — TELEPHONE ENCOUNTER
Last OV: 11/22/2024  Proposed Surgery: Right total hip arthroplasty   Surgery Date: TBD  Requesting Office Name: [facility]  Fax Number: [phone]  Preference of Location (default is surgery center unless specified by Cardiologist or [facility])  Prior Clearance Addressed: No    Is this a general clearance? YES   Anticoags/Antiplatelets: Apixaban   Anticoags/Antiplatelet managed by Cardiology? YES    Outstanding Cardiac Imaging : No  Ablation, Cardioversion, Stent, Cardiac Devices, Catheterization, Watchman: Yes  Date : 09/29/2023   TAVR/Valve, Mitral Clip, Watchman (including open heart),: N/A   Recent Cardiac Hospitalization: No            When: N/A  History (cardiac history): Past Medical History[1]        Is this a dental clearance? NO  Ablation, Cardioversion, Watchman, Stents, Cath, Devices within the last 3 months? No   If yes- Send dental wait letter, do not forward to provider for review.     TAVR / Valve, Mitral clip within the last 6 months? No  If yes- Send dental wait letter, do not forward to provider for review.     If completing a general clearance, continue per protocol.           Surgical Clearance Letter Sent: No Provider to advise.   **Scan clearance request letter into [email or web address].**         [1]   Past Medical History:  Diagnosis Date    Atrial fibrillation (HCC) 12/29/2011    Chickenpox     Essential hypertension, benign 07/24/2023    states controlled on meds    Hemorrhagic disorder (HCC)     on eliquis    High cholesterol     Long term (current) use of anticoagulants 12/29/2011    Obesity 12/29/2011    Other general symptoms(780.99)     Pure hypercholesterolemia 12/29/2011    Sleep apnea 12/29/2011    cpap    Unspecified hypothyroidism 12/29/2011

## 2025-05-14 NOTE — TELEPHONE ENCOUNTER
Called pt in regards to lab work BMP that was ordered at previous OV.No answer, LVM to call back. Pt has follow up appointment scheduled with MK on 05.23.2025.

## 2025-05-23 ENCOUNTER — OFFICE VISIT (OUTPATIENT)
Dept: CARDIOLOGY | Facility: MEDICAL CENTER | Age: 70
End: 2025-05-23
Attending: INTERNAL MEDICINE
Payer: MEDICARE

## 2025-05-23 VITALS
BODY MASS INDEX: 40.56 KG/M2 | RESPIRATION RATE: 16 BRPM | HEART RATE: 64 BPM | DIASTOLIC BLOOD PRESSURE: 62 MMHG | HEIGHT: 73 IN | WEIGHT: 306 LBS | SYSTOLIC BLOOD PRESSURE: 120 MMHG | OXYGEN SATURATION: 94 %

## 2025-05-23 DIAGNOSIS — Z79.899 HIGH RISK MEDICATION USE: ICD-10-CM

## 2025-05-23 DIAGNOSIS — D68.69 HYPERCOAGULABLE STATE DUE TO PAROXYSMAL ATRIAL FIBRILLATION (HCC): ICD-10-CM

## 2025-05-23 DIAGNOSIS — I48.0 PAROXYSMAL ATRIAL FIBRILLATION (HCC): ICD-10-CM

## 2025-05-23 DIAGNOSIS — I48.0 PAF (PAROXYSMAL ATRIAL FIBRILLATION) (HCC): Primary | ICD-10-CM

## 2025-05-23 DIAGNOSIS — I48.0 HYPERCOAGULABLE STATE DUE TO PAROXYSMAL ATRIAL FIBRILLATION (HCC): ICD-10-CM

## 2025-05-23 DIAGNOSIS — Z01.818 ENCOUNTER FOR PREOPERATIVE ASSESSMENT: ICD-10-CM

## 2025-05-23 DIAGNOSIS — Z98.890 S/P ABLATION OF ATRIAL FIBRILLATION: ICD-10-CM

## 2025-05-23 DIAGNOSIS — I10 ESSENTIAL HYPERTENSION, BENIGN: ICD-10-CM

## 2025-05-23 DIAGNOSIS — Z86.79 S/P ABLATION OF ATRIAL FIBRILLATION: ICD-10-CM

## 2025-05-23 LAB — EKG IMPRESSION: NORMAL

## 2025-05-23 PROCEDURE — 93005 ELECTROCARDIOGRAM TRACING: CPT | Mod: TC | Performed by: INTERNAL MEDICINE

## 2025-05-23 PROCEDURE — 99212 OFFICE O/P EST SF 10 MIN: CPT | Performed by: INTERNAL MEDICINE

## 2025-05-23 RX ORDER — VALSARTAN 160 MG/1
160 TABLET ORAL 2 TIMES DAILY
Qty: 200 TABLET | Refills: 3 | Status: SHIPPED | OUTPATIENT
Start: 2025-05-23

## 2025-05-23 RX ORDER — SOTALOL HYDROCHLORIDE 120 MG/1
120 TABLET ORAL 2 TIMES DAILY
Qty: 200 TABLET | Refills: 3 | Status: SHIPPED | OUTPATIENT
Start: 2025-05-23

## 2025-05-23 RX ORDER — METOPROLOL SUCCINATE 25 MG/1
25 TABLET, EXTENDED RELEASE ORAL DAILY
Qty: 100 TABLET | Refills: 3 | Status: SHIPPED | OUTPATIENT
Start: 2025-05-23

## 2025-05-23 ASSESSMENT — FIBROSIS 4 INDEX: FIB4 SCORE: 1.45

## 2025-05-23 NOTE — PROGRESS NOTES
"    Cardiology Follow Up Consultation Note    Date of note:    5/23/2025  Primary Care Provider: Omar Pineda M.D.  Referring Provider: No ref. provider found    Patient Name: Myke Bustos   YOB: 1955  MRN:              [id number]    Chief Complaint   Patient presents with    Atrial Fibrillation     F/V Dx: Paroxysmal atrial fibrillation        History of Present Illness: Mr. Myke Bustos is a 70 y.o. male with past medical history significant for persistent atrial fibrillation status post ablation, currently on antiarrhythmic therapy with sotalol, sleep apnea, hypertension, hypothyroidism who presents to the cardiology office for follow-up.    In 2023, patient had AF ablation. He had PVI, ablation of posterior wall, vein of Pete ethanol ablation, lateral mitral isthmus line with additional CS ablation sites and CTI line.  Following atrial fibrillation ablation, he had recurrence and started on amiodarone.  This was subsequently discontinued and transition to sotalol by EP cardiology.      On 11/22/2024 office visit, patient remained stable and continue on Sotalol.    Interval History:  He presents today for cardiac follow-up.  He states that he will be undergoing right hip replacement surgery in the next few weeks.    He has been stable from a cardiac standpoint.  He denies having chest pain or dyspnea.  No recurrence of atrial fibrillation.  Has been compliant with his sotalol as well as Eliquis.    He is able to perform activities of daily living without chest pain or dyspnea.  Able to walk up 1 flight of stairs without chest pain or dyspnea.      Cardiovascular Risk Factors:  1. Smoking status: Denies  2. Type II Diabetes Mellitus: Denies No results found for: \"HBA1C\"  3. Hypertension: Yes  4. Dyslipidemia: Yes No results found for: \"CHOLSTRLTOT\", \"LDL\", \"HDL\", \"TRIGLYCERIDE\"  5. Family history of early Coronary Artery Disease in a first degree relative (Male less than 55 years of " age; Female less than 65 years of age): Denies      Review of Systems:  As per HPI.  Review of systems assessed and are negative except as stated above.      Past Medical History:   Diagnosis Date    Atrial fibrillation (HCC) 12/29/2011    Chickenpox     Essential hypertension, benign 07/24/2023    states controlled on meds    Hemorrhagic disorder (HCC)     on eliquis    High cholesterol     Long term (current) use of anticoagulants 12/29/2011    Obesity 12/29/2011    Other general symptoms(780.99)     Pure hypercholesterolemia 12/29/2011    Sleep apnea 12/29/2011    cpap    Unspecified hypothyroidism 12/29/2011         Past Surgical History:   Procedure Laterality Date    APPENDECTOMY      1994         Current Outpatient Medications   Medication Sig Dispense Refill    apixaban (ELIQUIS) 5mg Tab Take 1 Tablet by mouth 2 times a day. 200 Tablet 3    valsartan (DIOVAN) 160 MG Tab Take 1 Tablet by mouth 2 times a day. 200 Tablet 3    metoprolol SR (TOPROL XL) 25 MG TABLET SR 24 HR Take 1 Tablet by mouth every day. 100 Tablet 3    sotalol (BETAPACE) 120 MG tablet Take 1 Tablet by mouth 2 times a day. 200 Tablet 3    levothyroxine (SYNTHROID) 150 MCG Tab Take 1 Tablet by mouth every morning on an empty stomach. 90 Tablet 0     No current facility-administered medications for this visit.         No Known Allergies      Family History   Problem Relation Age of Onset    Heart Disease Mother         ACUTE MI IN HER 60s.    Heart Disease Father         ACUTE MI IN HIS 50s.         Social History     Socioeconomic History    Marital status:      Spouse name: Not on file    Number of children: Not on file    Years of education: Not on file    Highest education level: Not on file   Occupational History    Not on file   Tobacco Use    Smoking status: Never    Smokeless tobacco: Never   Vaping Use    Vaping status: Never Used   Substance and Sexual Activity    Alcohol use: No    Drug use: Not Currently    Sexual activity:  "Not on file   Other Topics Concern    Not on file   Social History Narrative    Not on file     Social Drivers of Health     Financial Resource Strain: Not on file   Food Insecurity: Not on file   Transportation Needs: Not on file   Physical Activity: Not on file   Stress: Not on file   Social Connections: Not on file   Intimate Partner Violence: Not on file   Housing Stability: Not on file         Physical Exam:  Ambulatory Vitals  /62 (BP Location: Left arm, Patient Position: Sitting, BP Cuff Size: Adult)   Pulse 64   Resp 16   Ht 1.854 m (6' 1\")   Wt (!) 139 kg (306 lb)   SpO2 94%    Oxygen Therapy:  Pulse Oximetry: 94 %  BP Readings from Last 4 Encounters:   05/23/25 120/62   05/12/25 (!) 147/83   02/03/25 124/68   11/22/24 124/70       Weight/BMI: Body mass index is 40.37 kg/m².  Wt Readings from Last 4 Encounters:   05/23/25 (!) 139 kg (306 lb)   02/03/25 (!) 138 kg (305 lb)   11/22/24 (!) 140 kg (309 lb)   05/23/24 (!) 135 kg (297 lb)         General: Not in acute distress  HEENT: OP clear   Neck:  No carotid bruits, No JVD appreciated  CVS:  RRR, Normal S1, S2. No murmurs, rubs or gallops  Resp: Normal respiratory effort, lungs CTA bilaterally.  Skin: No obvious rashes, no cyanosis  Neurological: Alert and oriented x3, moves all extremities, no focal neurologic deficits  Psychiatric: Appropriate affect  Extremities:   Extremities warm, pulses intact, no edema    Lab Data Review:  Lab Results   Component Value Date/Time    CHOLSTRLTOT 166 11/19/2024 12:00 AM    LDL 86 11/19/2024 12:00 AM    HDL 59 11/19/2024 12:00 AM    TRIGLYCERIDE 110 11/19/2024 12:00 AM       Lab Results   Component Value Date/Time    SODIUM 133 (L) 09/27/2023 03:55 PM    POTASSIUM 4.2 09/27/2023 03:55 PM    CHLORIDE 102 09/27/2023 03:55 PM    CO2 20 09/27/2023 03:55 PM    GLUCOSE 97 09/27/2023 03:55 PM    BUN 22 09/27/2023 03:55 PM    CREATININE 0.82 09/27/2023 03:55 PM     Lab Results   Component Value Date/Time    ALKPHOSPHAT " "76 09/27/2023 03:55 PM    ASTSGOT 24 09/27/2023 03:55 PM    ALTSGPT 26 09/27/2023 03:55 PM    TBILIRUBIN 0.9 09/27/2023 03:55 PM      Lab Results   Component Value Date/Time    WBC 7.1 09/27/2023 03:55 PM    HEMOGLOBIN 14.9 09/27/2023 03:55 PM     No results found for: \"HBA1C\"      Cardiac Imaging and Procedures Review:    EKG dated 4/1/2024:   My personal interpretation is sinus rhythm, normal QT interval    EKG dated 11/22/2024:  Sinus rhythm, normal QTc interval    EKG dated 5/23/2025:  EKG tracing personally reviewed and interpreted by me which shows sinus rhythm rate of 62 bpm, nonspecific ST-T wave changes, normal QTc interval 441 ms.    Echo dated 12/2021:   Normal left ventricular systolic function.  The left ventricular ejection fraction is visually estimated to be 55%.  Mild mitral regurgitation.  The right ventricle is normal in size and systolic function.  Rhythm is atrial fibrillation.         Assessment & Plan     1. PAF (paroxysmal atrial fibrillation) (HCC)  EKG    Basic Metabolic Panel    MAGNESIUM      2. S/P ablation of atrial fibrillation  apixaban (ELIQUIS) 5mg Tab    EKG      3. Hypercoagulable state due to paroxysmal atrial fibrillation (HCC)        4. Encounter for preoperative assessment        5. Essential hypertension, benign  valsartan (DIOVAN) 160 MG Tab    metoprolol SR (TOPROL XL) 25 MG TABLET SR 24 HR      6. High risk medication use  Basic Metabolic Panel    MAGNESIUM      7. Paroxysmal atrial fibrillation (HCC)  sotalol (BETAPACE) 120 MG tablet            Medical Decision Making:  Mr. Myke Bustos is a 70 y.o. male with past medical history significant for persistent atrial fibrillation status post ablation, currently on antiarrhythmic therapy with sotalol, sleep apnea, hypertension, hypothyroidism who presents to the cardiology office for follow-up.    1. PAF (paroxysmal atrial fibrillation) (HCC) (Primary)  2. S/P ablation of atrial fibrillation  - Patient remains stable from a " cardiovascular standpoint.  He remains in sinus rhythm.  Continue sotalol 120 mg for antiarrhythmic therapy.  - No evidence of QTc prolongation on EKG.  - Continue Eliquis 5 mg twice daily.    3. Hypercoagulable state due to paroxysmal atrial fibrillation (HCC)  -No major bleeding issues.  Continue Eliquis 5 mg twice daily.    4. Encounter for preoperative assessment  -He will be undergoing moderate risk surgery.  He is stable from a cardiovascular standpoint.  Able to achieve greater than 4 METS without cardiac limitations.  - Patient is at low risk for Mace during proposed moderate risk surgery.  No additional preoperative cardiac testing recommended.  Continue beta-blocker in the perioperative period.    5. Essential hypertension, benign  -This is a chronic, stable condition.  Continue valsartan and metoprolol.    6. High risk medication use  -Patient is on high risk medication with sotalol.  Use of this medication requires active monitoring of electrolytes as well as EKG assessment to ensure no QTc prolongation which increases risk for life-threatening cardiac arrhythmias such as torsades de points and ventricular fibrillation.  EKG checked in office today for monitoring which shows no evidence of QTc prolongation.  We will check a BMP as well as magnesium levels to ensure no major electrolyte derangements.    () Today's E/M visit is associated with medical care services that serve as the continuing focal point for all needed health care services and/or with medical care services that  are part of ongoing care related to a patient's single, serious condition, or a complex condition: This includes  furnishing services to patients on an ongoing basis that result in care that is personalized  to the patient. The services result in a comprehensive, longitudinal, and continuous  relationship with the patient and involve delivery of team-based care that is accessible, coordinated with other practitioners and  providers, and integrated with the broader health care landscape.       It was a pleasure seeing Mr. Myke Bustos in the office today. Return in about 6 months (around 11/23/2025). Patient is aware to call the cardiology clinic with any questions or concerns.      Stefan Yao MD, [facility]  Cardiologist, [facility], [facility]  [address] [address]  Phone: [phone]  Fax: [phone]    Please note that this dictation was created using voice recognition software. I have made every reasonable attempt to correct obvious errors, but it is possible there are errors of grammar and possibly content that I did not discover before finalizing the note.

## 2025-06-26 ENCOUNTER — HOSPITAL ENCOUNTER (OUTPATIENT)
Dept: LAB | Facility: MEDICAL CENTER | Age: 70
End: 2025-06-26
Attending: ORTHOPAEDIC SURGERY
Payer: MEDICARE

## 2025-06-26 DIAGNOSIS — M00.062 STAPHYLOCOCCAL ARTHRITIS OF LEFT KNEE (HCC): ICD-10-CM

## 2025-06-26 LAB
CRP SERPL HS-MCNC: <0.3 MG/DL (ref 0–0.75)
ERYTHROCYTE [SEDIMENTATION RATE] IN BLOOD BY WESTERGREN METHOD: 9 MM/HOUR (ref 0–20)

## 2025-06-26 PROCEDURE — 85652 RBC SED RATE AUTOMATED: CPT

## 2025-06-26 PROCEDURE — 86140 C-REACTIVE PROTEIN: CPT

## 2025-06-26 PROCEDURE — 36415 COLL VENOUS BLD VENIPUNCTURE: CPT

## 2025-06-29 ENCOUNTER — PATIENT MESSAGE (OUTPATIENT)
Dept: CARDIOLOGY | Facility: MEDICAL CENTER | Age: 70
End: 2025-06-29
Payer: MEDICARE

## 2025-06-30 NOTE — PATIENT COMMUNICATION
"Noted pt MyChart message   ==============================  Chart reviewed. Clearance letter faxed 5/14/25   Noted in letter \"Can hold Eliquis 3 to 5 days prior to surgery. Restart anticoagulation when deemed safe from surgical standpoint.\"  ========================================   "

## 2025-07-16 DIAGNOSIS — Z86.79 S/P ABLATION OF ATRIAL FIBRILLATION: ICD-10-CM

## 2025-07-16 DIAGNOSIS — Z98.890 S/P ABLATION OF ATRIAL FIBRILLATION: ICD-10-CM

## 2025-07-17 RX ORDER — APIXABAN 5 MG/1
5 TABLET, FILM COATED ORAL 2 TIMES DAILY
Qty: 180 TABLET | Refills: 3 | OUTPATIENT
Start: 2025-07-17

## 2025-07-19 DIAGNOSIS — Z98.890 S/P ABLATION OF ATRIAL FIBRILLATION: ICD-10-CM

## 2025-07-19 DIAGNOSIS — Z86.79 S/P ABLATION OF ATRIAL FIBRILLATION: ICD-10-CM

## 2025-07-21 RX ORDER — APIXABAN 5 MG/1
5 TABLET, FILM COATED ORAL 2 TIMES DAILY
Qty: 180 TABLET | Refills: 3 | Status: SHIPPED | OUTPATIENT
Start: 2025-07-21